# Patient Record
Sex: MALE | Race: WHITE | NOT HISPANIC OR LATINO | Employment: OTHER | URBAN - METROPOLITAN AREA
[De-identification: names, ages, dates, MRNs, and addresses within clinical notes are randomized per-mention and may not be internally consistent; named-entity substitution may affect disease eponyms.]

---

## 2024-03-12 ENCOUNTER — OFFICE VISIT (OUTPATIENT)
Dept: FAMILY MEDICINE CLINIC | Facility: CLINIC | Age: 69
End: 2024-03-12
Payer: MEDICARE

## 2024-03-12 VITALS
DIASTOLIC BLOOD PRESSURE: 84 MMHG | HEART RATE: 82 BPM | SYSTOLIC BLOOD PRESSURE: 132 MMHG | BODY MASS INDEX: 31.67 KG/M2 | WEIGHT: 246.8 LBS | TEMPERATURE: 98.3 F | OXYGEN SATURATION: 99 % | HEIGHT: 74 IN

## 2024-03-12 DIAGNOSIS — R73.03 PREDIABETES: ICD-10-CM

## 2024-03-12 DIAGNOSIS — I25.118 CORONARY ARTERY DISEASE OF NATIVE ARTERY OF NATIVE HEART WITH STABLE ANGINA PECTORIS (HCC): ICD-10-CM

## 2024-03-12 DIAGNOSIS — Z51.81 ANTICOAGULATION GOAL OF INR 2 TO 3: ICD-10-CM

## 2024-03-12 DIAGNOSIS — I10 PRIMARY HYPERTENSION: ICD-10-CM

## 2024-03-12 DIAGNOSIS — R60.0 BILATERAL LEG EDEMA: ICD-10-CM

## 2024-03-12 DIAGNOSIS — N18.32 CKD STAGE G3B/A1, GFR 30-44 AND ALBUMIN CREATININE RATIO <30 MG/G (HCC): Primary | ICD-10-CM

## 2024-03-12 DIAGNOSIS — K21.9 GASTROESOPHAGEAL REFLUX DISEASE WITHOUT ESOPHAGITIS: ICD-10-CM

## 2024-03-12 DIAGNOSIS — Z79.01 ANTICOAGULATION GOAL OF INR 2 TO 3: ICD-10-CM

## 2024-03-12 PROBLEM — E78.2 MIXED HYPERLIPIDEMIA: Status: ACTIVE | Noted: 2024-03-12

## 2024-03-12 PROBLEM — I48.0 PAROXYSMAL ATRIAL FIBRILLATION (HCC): Status: ACTIVE | Noted: 2024-03-12

## 2024-03-12 PROBLEM — I25.10 CORONARY ARTERY DISEASE: Status: ACTIVE | Noted: 2024-03-12

## 2024-03-12 PROBLEM — Z95.1 HX OF CABG: Status: ACTIVE | Noted: 2022-02-23

## 2024-03-12 PROBLEM — N40.0 BPH (BENIGN PROSTATIC HYPERPLASIA): Status: ACTIVE | Noted: 2023-04-20

## 2024-03-12 PROCEDURE — 99204 OFFICE O/P NEW MOD 45 MIN: CPT | Performed by: FAMILY MEDICINE

## 2024-03-12 RX ORDER — COLCHICINE 0.6 MG/1
0.6 TABLET ORAL 2 TIMES DAILY
COMMUNITY
Start: 2023-11-28 | End: 2024-11-27

## 2024-03-12 RX ORDER — LOSARTAN POTASSIUM 25 MG/1
25 TABLET ORAL DAILY
COMMUNITY

## 2024-03-12 RX ORDER — ISOSORBIDE MONONITRATE 30 MG/1
90 TABLET, EXTENDED RELEASE ORAL DAILY
COMMUNITY
Start: 2023-11-10

## 2024-03-12 RX ORDER — NITROGLYCERIN 0.4 MG/1
1 TABLET SUBLINGUAL
COMMUNITY
Start: 2023-09-29 | End: 2024-09-28

## 2024-03-12 RX ORDER — WARFARIN SODIUM 5 MG/1
TABLET ORAL
COMMUNITY

## 2024-03-12 RX ORDER — BUSPIRONE HYDROCHLORIDE 5 MG/1
5 TABLET ORAL 2 TIMES DAILY
COMMUNITY

## 2024-03-12 RX ORDER — FINASTERIDE 5 MG/1
5 TABLET, FILM COATED ORAL DAILY
COMMUNITY

## 2024-03-12 RX ORDER — WARFARIN SODIUM 7.5 MG/1
7.5 TABLET ORAL DAILY
COMMUNITY

## 2024-03-12 RX ORDER — ATORVASTATIN CALCIUM 80 MG/1
80 TABLET, FILM COATED ORAL EVERY EVENING
COMMUNITY

## 2024-03-12 RX ORDER — TORSEMIDE 20 MG/1
20 TABLET ORAL DAILY
COMMUNITY

## 2024-03-12 RX ORDER — METOPROLOL SUCCINATE 50 MG/1
150 TABLET, EXTENDED RELEASE ORAL DAILY
COMMUNITY
Start: 2023-08-22 | End: 2024-08-21

## 2024-03-12 NOTE — PROGRESS NOTES
Name: John Paul Borjas      : 1955      MRN: [id number]  Encounter Provider: Hadley Vargas MD  Encounter Date: 3/12/2024   Encounter department: [facility]    Assessment & Plan     1. CKD stage G3b/A1, GFR 30-44 and albumin creatinine ratio <30 mg/g (HCC)  -     Ambulatory Referral to Nephrology; Future    2. Coronary artery disease of native artery of native heart with stable angina pectoris (HCC)  -     Lipid panel; Future    3. Primary hypertension    4. Prediabetes  -     Hemoglobin A1C; Future    5. Gastroesophageal reflux disease without esophagitis    6. Bilateral leg edema    7. Anticoagulation goal of INR 2 to 3  -     Protime-INR; Standing      LE edema likely secondary to history of graft harvest and CKD  Recommend patient check his weight daily and take extra dose of torsemide if his weight increase by 3lb in 1 day or 5lb in 5 day.  Will obtain lipid panel and hba1c for eval  Recheck INR once monthly  Continue current medications    Establish care with nephrology. Discuss continuing losartan.          Subjective     HPI    68-year-old male patient presents for establishment of care.  Patient does have a history of cardiovascular disease status post stent and bypass surgery.  Patient has been seeing his cardiologist regularly however would like to establish with a primary care doctor.  In addition to cardiac issues, patient does have baseline chronic kidney disease, based on most recent blood work, patient is renal function is consistent with stage III-IV chronic kidney disease.  He has not seen by nephrology at this time.  Has been taking his medications including losartan, Lipitor, Imdur, metoprolol, Brilinta, torsemide.  Patient is anticoagulated with warfarin due to history of atrial fibrillation.   Last INR completed on 24: 2.6.       Review of Systems   Constitutional:  Positive for fatigue. Negative for chills and fever.   HENT:  Negative for congestion, sinus pain and  sore throat.    Respiratory:  Positive for shortness of breath. Negative for chest tightness.    Cardiovascular:  Positive for leg swelling. Negative for chest pain and palpitations.   Gastrointestinal:  Negative for abdominal pain.   Musculoskeletal:  Positive for back pain.        Butt and leg pain   Neurological:  Negative for dizziness, light-headedness and headaches.   Psychiatric/Behavioral:  Positive for sleep disturbance.        Past Medical History:   Diagnosis Date    Coronary artery disease 3/12/2024    GERD (gastroesophageal reflux disease) 3/12/2024    Gout     H/O three vessel coronary artery bypass     Hypertension 3/12/2024    Myocardial infarction (HCC)      Past Surgical History:   Procedure Laterality Date    CORONARY ANGIOPLASTY WITH STENT PLACEMENT      CORONARY ARTERY BYPASS GRAFT       No family history on file.  Social History     Socioeconomic History    Marital status: /Civil Union     Spouse name: Not on file    Number of children: Not on file    Years of education: Not on file    Highest education level: Not on file   Occupational History    Not on file   Tobacco Use    Smoking status: Not on file    Smokeless tobacco: Not on file   Substance and Sexual Activity    Alcohol use: Not on file    Drug use: Not on file    Sexual activity: Not on file   Other Topics Concern    Not on file   Social History Narrative    Not on file     Social Determinants of Health     Financial Resource Strain: Low Risk  (10/2/2023)    Received from [facility]    Overall Financial Resource Strain (CARDIA)     Difficulty of Paying Living Expenses: Not hard at all   Food Insecurity: No Food Insecurity (10/2/2023)    Received from [facility]    Hunger Vital Sign     Worried About Running Out of Food in the Last Year: Never true     Ran Out of Food in the Last Year: Never true   Transportation Needs: No Transportation Needs (10/2/2023)    Received from [facility]    PRAPARE - Transportation     Lack of Transportation (Medical): No     Lack of Transportation (Non-Medical): No   Physical Activity: Not on file   Stress: Not on file   Social Connections: Not on file   Intimate Partner Violence: Not At Risk (11/28/2023)    Received from [facility]    Humiliation, Afraid, Rape, and Kick questionnaire     Fear of Current or Ex-Partner: No     Emotionally Abused: No     Physically Abused: No     Sexually Abused: No   Housing Stability: Low Risk  (10/2/2023)    Received from [facility]    Housing Stability Vital Sign     Unable to Pay for Housing in the Last Year: No     Number of Places Lived in the Last Year: 1     Unstable Housing in the Last Year: No     Current Outpatient Medications on File Prior to Visit   Medication Sig    atorvastatin (LIPITOR) 80 mg tablet Take 80 mg by mouth every evening    busPIRone (BUSPAR) 5 mg tablet Take 5 mg by mouth 2 (two) times a day    colchicine (COLCRYS) 0.6 mg tablet Take 0.6 mg by mouth 2 (two) times a day    finasteride (PROSCAR) 5 mg tablet Take 5 mg by mouth daily    isosorbide mononitrate (IMDUR) 30 mg 24 hr tablet Take 90 mg by mouth daily    losartan (COZAAR) 25 mg tablet Take 25 mg by mouth daily    metoprolol succinate (TOPROL-XL) 50 mg 24 hr tablet Take 150 mg by mouth daily    nitroglycerin (NITROSTAT) 0.4 mg SL tablet Place 1 tablet under the tongue every 5 (five) minutes as needed    ticagrelor (BRILINTA) 90 MG Take 90 mg by mouth 2 (two) times a day    torsemide (DEMADEX) 20 mg tablet Take 20 mg by mouth daily    warfarin (COUMADIN) 5 mg tablet take 1 AND 1/2 to 2 tablets by mouth daily as directed    warfarin (COUMADIN) 7.5 mg tablet Take 7.5 mg by mouth daily     No Known Allergies  Immunization History   Administered Date(s) Administered    COVID-19 MODERNA VACC 0.5 ML IM 03/25/2021, 04/22/2021, 01/05/2022       Objective     /84 (BP Location: Left arm, Patient Position:  "Sitting, Cuff Size: Large)   Pulse 82   Temp 98.3 °F (36.8 °C) (Temporal)   Ht 6' 2\" (1.88 m)   Wt 112 kg (246 lb 12.8 oz)   SpO2 99%   BMI 31.69 kg/m²     Physical Exam  Vitals reviewed.   Constitutional:       General: He is not in acute distress.     Appearance: Normal appearance. He is well-developed. He is obese. He is not ill-appearing, toxic-appearing or diaphoretic.   HENT:      Head: Normocephalic and atraumatic.      Nose: Nose normal.   Eyes:      Pupils: Pupils are equal, round, and reactive to light.   Cardiovascular:      Rate and Rhythm: Normal rate and regular rhythm.      Pulses: Normal pulses.      Heart sounds: Normal heart sounds. No murmur heard.     No friction rub. No gallop.   Pulmonary:      Effort: Pulmonary effort is normal. No respiratory distress.      Breath sounds: Normal breath sounds.   Abdominal:      General: Bowel sounds are normal. There is no distension.      Palpations: Abdomen is soft.      Tenderness: There is no abdominal tenderness. There is no guarding.   Musculoskeletal:         General: No swelling, tenderness, deformity or signs of injury. Normal range of motion.   Skin:     General: Skin is warm and dry.      Capillary Refill: Capillary refill takes less than 2 seconds.      Coloration: Skin is not jaundiced.      Findings: No erythema or rash.   Neurological:      General: No focal deficit present.      Mental Status: He is alert and oriented to person, place, and time. Mental status is at baseline.      Cranial Nerves: No cranial nerve deficit.   Psychiatric:         Mood and Affect: Mood normal.         Hadley Vargas MD    "

## 2024-03-13 ENCOUNTER — TELEPHONE (OUTPATIENT)
Dept: NEPHROLOGY | Facility: CLINIC | Age: 69
End: 2024-03-13

## 2024-03-13 NOTE — TELEPHONE ENCOUNTER
I called and spoke to the patient and schedule him for his Nephrology Consult appointment ref by Dr. Hadley Vargas for  CKD stage G3b/A1, GFR 30-44 and albumin creatinine ratio <30 mg/g. Patient understood and was okay with the day and time of his appointment and patient understood that his appointment was with Lorena (NP).

## 2024-04-04 ENCOUNTER — TELEPHONE (OUTPATIENT)
Dept: OTHER | Facility: OTHER | Age: 69
End: 2024-04-04

## 2024-04-04 LAB
CHOLEST SERPL-MCNC: 144 MG/DL
CHOLEST/HDLC SERPL: 3.6 {RATIO}
EST. AVERAGE GLUCOSE BLD GHB EST-MCNC: 128 MG/DL
HBA1C MFR BLD: 6.1 %
HDLC SERPL-MCNC: 40 MG/DL (ref 23–92)
INR PPP: 3.3
LDLC SERPL CALC-MCNC: 72 MG/DL
NONHDLC SERPL-MCNC: 104 MG/DL
PROTHROMBIN TIME: 32.5 SEC (ref 12–14.6)
TRIGL SERPL-MCNC: 160 MG/DL

## 2024-04-04 NOTE — TELEPHONE ENCOUNTER
Patient called to confirm the type of blood work ordered by PCP. Triage RN made patient aware of that A1C, Lipid panel and INR was ordered on 3/12.

## 2024-04-23 ENCOUNTER — OFFICE VISIT (OUTPATIENT)
Dept: FAMILY MEDICINE CLINIC | Facility: CLINIC | Age: 69
End: 2024-04-23
Payer: MEDICARE

## 2024-04-23 VITALS
TEMPERATURE: 97.5 F | DIASTOLIC BLOOD PRESSURE: 80 MMHG | BODY MASS INDEX: 31.1 KG/M2 | SYSTOLIC BLOOD PRESSURE: 132 MMHG | OXYGEN SATURATION: 99 % | WEIGHT: 242.2 LBS | HEART RATE: 39 BPM

## 2024-04-23 DIAGNOSIS — Z11.59 ENCOUNTER FOR HEPATITIS C SCREENING TEST FOR LOW RISK PATIENT: ICD-10-CM

## 2024-04-23 DIAGNOSIS — Z12.11 ENCOUNTER FOR COLORECTAL CANCER SCREENING: ICD-10-CM

## 2024-04-23 DIAGNOSIS — Z12.5 PROSTATE CANCER SCREENING: ICD-10-CM

## 2024-04-23 DIAGNOSIS — Z12.12 ENCOUNTER FOR COLORECTAL CANCER SCREENING: ICD-10-CM

## 2024-04-23 DIAGNOSIS — R35.1 NOCTURIA: ICD-10-CM

## 2024-04-23 DIAGNOSIS — L03.032 CELLULITIS OF TOE OF LEFT FOOT: ICD-10-CM

## 2024-04-23 DIAGNOSIS — Z00.00 MEDICARE ANNUAL WELLNESS VISIT, SUBSEQUENT: Primary | ICD-10-CM

## 2024-04-23 PROCEDURE — G0438 PPPS, INITIAL VISIT: HCPCS | Performed by: FAMILY MEDICINE

## 2024-04-23 RX ORDER — TAMSULOSIN HYDROCHLORIDE 0.4 MG/1
0.4 CAPSULE ORAL
Qty: 30 CAPSULE | Refills: 0 | Status: SHIPPED | OUTPATIENT
Start: 2024-04-23

## 2024-04-23 RX ORDER — CEPHALEXIN 500 MG/1
500 CAPSULE ORAL EVERY 6 HOURS SCHEDULED
Qty: 28 CAPSULE | Refills: 0 | Status: SHIPPED | OUTPATIENT
Start: 2024-04-23 | End: 2024-04-30

## 2024-04-23 NOTE — PROGRESS NOTES
Assessment and Plan:     Problem List Items Addressed This Visit    None  Visit Diagnoses       Encounter for colorectal cancer screening    -  Primary    Relevant Orders    Ambulatory Referral to Gastroenterology    Cellulitis of toe of left foot        Relevant Medications    cephalexin (KEFLEX) 500 mg capsule    Nocturia        Relevant Medications    tamsulosin (FLOMAX) 0.4 mg    Prostate cancer screening        Relevant Orders    PSA, Total Screen    Encounter for hepatitis C screening test for low risk patient        Relevant Orders    Hepatitis C antibody             Preventive health issues were discussed with patient, and age appropriate screening tests were ordered as noted in patient's After Visit Summary.  Personalized health advice and appropriate referrals for health education or preventive services given if needed, as noted in patient's After Visit Summary.    Depression Screening Follow-up Plan: Patient's depression screening was positive with a PHQ-2 score of 4. Their PHQ-9 score was 10. Patient advised to follow-up with PCP for further management.  Patient's depressive symptom is secondary to recent grief.  A diagnosis of major depressive disorder is inappropriate at this time       History of Present Illness:     Patient presents for a Medicare Wellness Visit    HPI     Pt here for AMW visit. Ex-wife, high school sweet heart recently passed away on 4/12/24.   Patient is still processing the loss.   Patient is requesting a paperwork for temporary disability placard so he can park closer to different locations.  Continues to feel out of breath with minimal exertion.  Currently undergoing cardiac rehab which seems to be helping with some symptoms.    Patient has noticed some mild swelling and crusting over the second and third toe on his left foot.    There is a blood blister on the big toe of the left foot    PHQ-2/9 Depression Screening    Little interest or pleasure in doing things: 2 - more than  half the days  Feeling down, depressed, or hopeless: 2 - more than half the days  Trouble falling or staying asleep, or sleeping too much: 3 - nearly every day  Feeling tired or having little energy: 1 - several days  Poor appetite or overeatin - not at all  Feeling bad about yourself - or that you are a failure or have let yourself or your family down: 0 - not at all  Trouble concentrating on things, such as reading the newspaper or watching television: 2 - more than half the days  Moving or speaking so slowly that other people could have noticed. Or the opposite - being so fidgety or restless that you have been moving around a lot more than usual: 0 - not at all  Thoughts that you would be better off dead, or of hurting yourself in some way: 0 - not at all  PHQ-2 Score: 4  PHQ-2 Interpretation: POSITIVE depression screen  PHQ-9 Score: 10  PHQ-9 Interpretation: Moderate depression           Patient Care Team:  Hdaley Vargas MD as PCP - General (Family Medicine)    Review of Systems   Constitutional:  Positive for fatigue. Negative for chills and fever.   Respiratory:  Positive for shortness of breath. Negative for chest tightness.    Cardiovascular:  Negative for chest pain.   Genitourinary:  Positive for frequency.        Frequent nocturia   Skin:  Positive for rash (rash and swelling involving 2nd and 3rd toe of the left foot). Negative for wound.   Neurological:  Negative for dizziness, light-headedness and headaches.   Psychiatric/Behavioral:  Positive for dysphoric mood (wife passed away recently).           Problem List:     Patient Active Problem List   Diagnosis    Hypertension    Coronary artery disease    GERD (gastroesophageal reflux disease)    Bilateral leg edema    Hx of CABG    BPH (benign prostatic hyperplasia)    Mixed hyperlipidemia    Paroxysmal atrial fibrillation (HCC)      Past Medical and Surgical History:     Past Medical History:   Diagnosis Date    Coronary artery disease  3/12/2024    GERD (gastroesophageal reflux disease) 3/12/2024    Gout     H/O three vessel coronary artery bypass     Hypertension 3/12/2024    Myocardial infarction (HCC)      Past Surgical History:   Procedure Laterality Date    CORONARY ANGIOPLASTY WITH STENT PLACEMENT      CORONARY ARTERY BYPASS GRAFT        Family History:     Family History   Problem Relation Age of Onset    Stroke Mother     Heart disease Brother       Social History:     Social History     Socioeconomic History    Marital status:      Spouse name: None    Number of children: None    Years of education: None    Highest education level: None   Occupational History    None   Tobacco Use    Smoking status: Some Days     Types: Cigars     Passive exposure: Current    Smokeless tobacco: None   Vaping Use    Vaping status: Never Used   Substance and Sexual Activity    Alcohol use: Not Currently    Drug use: Yes     Types: Marijuana    Sexual activity: None   Other Topics Concern    None   Social History Narrative    None     Social Determinants of Health     Financial Resource Strain: Low Risk  (10/2/2023)    Received from [facility]    Overall Financial Resource Strain (CARDIA)     Difficulty of Paying Living Expenses: Not hard at all   Food Insecurity: No Food Insecurity (4/23/2024)    Hunger Vital Sign     Worried About Running Out of Food in the Last Year: Never true     Ran Out of Food in the Last Year: Never true   Transportation Needs: No Transportation Needs (4/23/2024)    PRAPARE - Transportation     Lack of Transportation (Medical): No     Lack of Transportation (Non-Medical): No   Physical Activity: Not on file   Stress: Not on file   Social Connections: Not on file   Intimate Partner Violence: Not At Risk (11/28/2023)    Received from [facility]    Humiliation, Afraid, Rape, and Kick questionnaire     Fear of Current or Ex-Partner: No     Emotionally Abused: No     Physically Abused: No      Sexually Abused: No   Housing Stability: Low Risk  (4/23/2024)    Housing Stability Vital Sign     Unable to Pay for Housing in the Last Year: No     Number of Places Lived in the Last Year: 1     Unstable Housing in the Last Year: No      Medications and Allergies:     Current Outpatient Medications   Medication Sig Dispense Refill    atorvastatin (LIPITOR) 80 mg tablet Take 80 mg by mouth every evening      busPIRone (BUSPAR) 5 mg tablet Take 5 mg by mouth 2 (two) times a day      cephalexin (KEFLEX) 500 mg capsule Take 1 capsule (500 mg total) by mouth every 6 (six) hours for 7 days 28 capsule 0    colchicine (COLCRYS) 0.6 mg tablet Take 0.6 mg by mouth 2 (two) times a day      finasteride (PROSCAR) 5 mg tablet Take 5 mg by mouth daily      isosorbide mononitrate (IMDUR) 30 mg 24 hr tablet Take 90 mg by mouth daily      losartan (COZAAR) 25 mg tablet Take 25 mg by mouth daily      metoprolol succinate (TOPROL-XL) 50 mg 24 hr tablet Take 150 mg by mouth daily      nitroglycerin (NITROSTAT) 0.4 mg SL tablet Place 1 tablet under the tongue every 5 (five) minutes as needed      tamsulosin (FLOMAX) 0.4 mg Take 1 capsule (0.4 mg total) by mouth daily with dinner 30 capsule 0    ticagrelor (BRILINTA) 90 MG Take 90 mg by mouth 2 (two) times a day      torsemide (DEMADEX) 20 mg tablet Take 20 mg by mouth daily      warfarin (COUMADIN) 5 mg tablet take 1 AND 1/2 to 2 tablets by mouth daily as directed      warfarin (COUMADIN) 7.5 mg tablet Take 7.5 mg by mouth daily       No current facility-administered medications for this visit.     No Known Allergies   Immunizations:     Immunization History   Administered Date(s) Administered    COVID-19 MODERNA VACC 0.5 ML IM 03/25/2021, 04/22/2021, 01/05/2022      Health Maintenance:         Topic Date Due    Hepatitis C Screening  Never done    Colorectal Cancer Screening  Never done         Topic Date Due    Pneumococcal Vaccine: 65+ Years (1 of 2 - PCV) Never done    Influenza  Vaccine (1) Never done    COVID-19 Vaccine (4 - 2023-24 season) 09/01/2023      Medicare Screening Tests and Risk Assessments:     John Paul is here for his Initial Wellness visit.     Health Risk Assessment:   Patient rates overall health as fair. Patient feels that their physical health rating is slightly worse. Patient is satisfied with their life. Eyesight was rated as same. Hearing was rated as same. Patient feels that their emotional and mental health rating is slightly worse. Patients states they are sometimes angry. Patient states they are often unusually tired/fatigued. Pain experienced in the last 7 days has been some. Patient's pain rating has been 3/10. Patient states that he has experienced no weight loss or gain in last 6 months. Leg, buttocks pain    Depression Screening:   PHQ-2 Score: 4  PHQ-9 Score: 10      Fall Risk Screening:   In the past year, patient has experienced: no history of falling in past year      Home Safety:  Patient does not have trouble with stairs inside or outside of their home. Patient has working smoke alarms and has working carbon monoxide detector. Home safety hazards include: none.     Nutrition:   mediterranean    Medications:   Patient is currently taking over-the-counter supplements. OTC medications include: see medication list. Patient is able to manage medications.     Activities of Daily Living (ADLs)/Instrumental Activities of Daily Living (IADLs):   Walk and transfer into and out of bed and chair?: Yes  Dress and groom yourself?: Yes    Bathe or shower yourself?: Yes    Feed yourself? Yes  Do your laundry/housekeeping?: Yes  Manage your money, pay your bills and track your expenses?: Yes  Make your own meals?: Yes    Do your own shopping?: Yes    Previous Hospitalizations:   Any hospitalizations or ED visits within the last 12 months?: Yes    How many hospitalizations have you had in the last year?: 1-2    Advance Care Planning:   Living will: No    Advanced directive  counseling given: Yes      Comments: 5 wishes given    Cognitive Screening:   Provider or family/friend/caregiver concerned regarding cognition?: Yes    PREVENTIVE SCREENINGS      Cardiovascular Screening:    General: History Lipid Disorder and Screening Current      Diabetes Screening:     General: Screening Current      Colorectal Cancer Screening:     General: Risks and Benefits Discussed    Due for: Colonoscopy - Low Risk      Prostate Cancer Screening:    General: Risks and Benefits Discussed    Due for: PSA      Osteoporosis Screening:    General: Screening Not Indicated      Abdominal Aortic Aneurysm (AAA) Screening:    Risk factors include: age between 65-76 yo and tobacco use        General: Screening Not Indicated      Lung Cancer Screening:     General: Screening Not Indicated      Hepatitis C Screening:    General: Risks and Benefits Discussed    Hep C Screening Accepted: Yes      Screening, Brief Intervention, and Referral to Treatment (SBIRT)    Screening      Single Item Drug Screening:  How often have you used an illegal drug (including marijuana) or a prescription medication for non-medical reasons in the past year? weekly  What drug(s) or prescription medication(s)? marijuana    Single Item Drug Screen Score: 3  Interpretation: POSITIVE screen for possible drug use disorder    Drug Abuse Screening Test (DAST-10):  1) Have you used drugs other than those required for medical reasons? Yes    Other Counseling Topics:   Car/seat belt/driving safety, skin self-exam, sunscreen and calcium and vitamin D intake and regular weightbearing exercise.     No results found.     Physical Exam:     /80 (BP Location: Left arm, Patient Position: Sitting, Cuff Size: Large)   Pulse (!) 39   Temp 97.5 °F (36.4 °C) (Temporal)   Wt 110 kg (242 lb 3.2 oz)   SpO2 99%   BMI 31.10 kg/m²     Physical Exam  Vitals reviewed.   Constitutional:       General: He is not in acute distress.     Appearance: Normal  appearance. He is obese. He is not ill-appearing, toxic-appearing or diaphoretic.   Cardiovascular:      Rate and Rhythm: Normal rate and regular rhythm.      Pulses: Normal pulses.      Heart sounds: Normal heart sounds. No murmur heard.  Pulmonary:      Effort: Pulmonary effort is normal. No respiratory distress.      Breath sounds: Normal breath sounds.   Abdominal:      General: Abdomen is flat. Bowel sounds are normal. There is no distension.      Palpations: Abdomen is soft.   Musculoskeletal:         General: No swelling or deformity.      Right lower leg: Edema present.      Left lower leg: Edema present.      Comments: Mild pitting edema   Skin:     Capillary Refill: Capillary refill takes less than 2 seconds.      Coloration: Skin is not jaundiced.      Findings: No bruising.      Comments: Mild swelling involving the second and third toe of the left foot, some dry skin/crusting over the affected area  No significant erythema  There is a subcentimeter blister on the lateral edge of the great toe on the left foot   Neurological:      General: No focal deficit present.      Mental Status: He is alert.   Psychiatric:         Mood and Affect: Mood normal.           Hadley Vargas MD

## 2024-04-23 NOTE — PATIENT INSTRUCTIONS
Medicare Preventive Visit Patient Instructions  Thank you for completing your Welcome to Medicare Visit or Medicare Annual Wellness Visit today. Your next wellness visit will be due in one year (4/24/2025).  The screening/preventive services that you may require over the next 5-10 years are detailed below. Some tests may not apply to you based off risk factors and/or age. Screening tests ordered at today's visit but not completed yet may show as past due. Also, please note that scanned in results may not display below.  Preventive Screenings:  Service Recommendations Previous Testing/Comments   Colorectal Cancer Screening  Colonoscopy    Fecal Occult Blood Test (FOBT)/Fecal Immunochemical Test (FIT)  Fecal DNA/Cologuard Test  Flexible Sigmoidoscopy Age: 45-75 years old   Colonoscopy: every 10 years (May be performed more frequently if at higher risk)  OR  FOBT/FIT: every 1 year  OR  Cologuard: every 3 years  OR  Sigmoidoscopy: every 5 years  Screening may be recommended earlier than age 45 if at higher risk for colorectal cancer. Also, an individualized decision between you and your healthcare provider will decide whether screening between the ages of 76-85 would be appropriate. Colonoscopy: Not on file  FOBT/FIT: Not on file  Cologuard: Not on file  Sigmoidoscopy: Not on file          Prostate Cancer Screening Individualized decision between patient and health care provider in men between ages of 55-69   Medicare will cover every 12 months beginning on the day after your 50th birthday PSA: No results in last 5 years           Hepatitis C Screening Once for adults born between 1945 and 1965  More frequently in patients at high risk for Hepatitis C Hep C Antibody: Not on file        Diabetes Screening 1-2 times per year if you're at risk for diabetes or have pre-diabetes Fasting glucose: No results in last 5 years (No results in last 5 years)  A1C: 6.1 % (4/4/2024)  Screening Current   Cholesterol Screening Once  every 5 years if you don't have a lipid disorder. May order more often based on risk factors. Lipid panel: 04/04/2024  Screening Not Indicated  History Lipid Disorder      Other Preventive Screenings Covered by Medicare:  Abdominal Aortic Aneurysm (AAA) Screening: covered once if your at risk. You're considered to be at risk if you have a family history of AAA or a male between the age of 65-75 who smoking at least 100 cigarettes in your lifetime.  Lung Cancer Screening: covers low dose CT scan once per year if you meet all of the following conditions: (1) Age 55-77; (2) No signs or symptoms of lung cancer; (3) Current smoker or have quit smoking within the last 15 years; (4) You have a tobacco smoking history of at least 20 pack years (packs per day x number of years you smoked); (5) You get a written order from a healthcare provider.  Glaucoma Screening: covered annually if you're considered high risk: (1) You have diabetes OR (2) Family history of glaucoma OR (3)  aged 50 and older OR (4)  American aged 65 and older  Osteoporosis Screening: covered every 2 years if you meet one of the following conditions: (1) Have a vertebral abnormality; (2) On glucocorticoid therapy for more than 3 months; (3) Have primary hyperparathyroidism; (4) On osteoporosis medications and need to assess response to drug therapy.  HIV Screening: covered annually if you're between the age of 15-65. Also covered annually if you are younger than 15 and older than 65 with risk factors for HIV infection. For pregnant patients, it is covered up to 3 times per pregnancy.    Immunizations:  Immunization Recommendations   Influenza Vaccine Annual influenza vaccination during flu season is recommended for all persons aged >= 6 months who do not have contraindications   Pneumococcal Vaccine   * Pneumococcal conjugate vaccine = PCV13 (Prevnar 13), PCV15 (Vaxneuvance), PCV20 (Prevnar 20)  * Pneumococcal polysaccharide vaccine  = PPSV23 (Pneumovax) Adults 19-65 yo with certain risk factors or if 65+ yo  If never received any pneumonia vaccine: recommend Prevnar 20 (PCV20)  Give PCV20 if previously received 1 dose of PCV13 or PPSV23   Hepatitis B Vaccine 3 dose series if at intermediate or high risk (ex: diabetes, end stage renal disease, liver disease)   Respiratory syncytial virus (RSV) Vaccine - COVERED BY MEDICARE PART D  * RSVPreF3 (Arexvy) CDC recommends that adults 60 years of age and older may receive a single dose of RSV vaccine using shared clinical decision-making (SCDM)   Tetanus (Td) Vaccine - COST NOT COVERED BY MEDICARE PART B Following completion of primary series, a booster dose should be given every 10 years to maintain immunity against tetanus. Td may also be given as tetanus wound prophylaxis.   Tdap Vaccine - COST NOT COVERED BY MEDICARE PART B Recommended at least once for all adults. For pregnant patients, recommended with each pregnancy.   Shingles Vaccine (Shingrix) - COST NOT COVERED BY MEDICARE PART B  2 shot series recommended in those 19 years and older who have or will have weakened immune systems or those 50 years and older     Health Maintenance Due:      Topic Date Due   • Hepatitis C Screening  Never done   • Colorectal Cancer Screening  Never done     Immunizations Due:      Topic Date Due   • Pneumococcal Vaccine: 65+ Years (1 of 2 - PCV) Never done   • Influenza Vaccine (1) Never done   • COVID-19 Vaccine (4 - 2023-24 season) 09/01/2023     Advance Directives   What are advance directives?  Advance directives are legal documents that state your wishes and plans for medical care. These plans are made ahead of time in case you lose your ability to make decisions for yourself. Advance directives can apply to any medical decision, such as the treatments you want, and if you want to donate organs.   What are the types of advance directives?  There are many types of advance directives, and each state has rules  about how to use them. You may choose a combination of any of the following:  Living will:  This is a written record of the treatment you want. You can also choose which treatments you do not want, which to limit, and which to stop at a certain time. This includes surgery, medicine, IV fluid, and tube feedings.   Durable power of  for healthcare (DPAHC):  This is a written record that states who you want to make healthcare choices for you when you are unable to make them for yourself. This person, called a proxy, is usually a family member or a friend. You may choose more than 1 proxy.  Do not resuscitate (DNR) order:  A DNR order is used in case your heart stops beating or you stop breathing. It is a request not to have certain forms of treatment, such as CPR. A DNR order may be included in other types of advance directives.  Medical directive:  This covers the care that you want if you are in a coma, near death, or unable to make decisions for yourself. You can list the treatments you want for each condition. Treatment may include pain medicine, surgery, blood transfusions, dialysis, IV or tube feedings, and a ventilator (breathing machine).  Values history:  This document has questions about your views, beliefs, and how you feel and think about life. This information can help others choose the care that you would choose.  Why are advance directives important?  An advance directive helps you control your care. Although spoken wishes may be used, it is better to have your wishes written down. Spoken wishes can be misunderstood, or not followed. Treatments may be given even if you do not want them. An advance directive may make it easier for your family to make difficult choices about your care.   Depression   Depression  is a medical condition that causes feelings of sadness or hopelessness that do not go away. Depression may cause you to lose interest in things you used to enjoy. These feelings may  interfere with your daily life.  Call your local emergency number (911 in the [place]) if:   You think about harming yourself or someone else.  You have done something on purpose to hurt yourself.  The following resources are available at any time to help you, if needed:   National Suicide Prevention Lifeline: 1-970.971.3418 (1-917-733-TALK)   Suicide Hotline: 1-171.941.2647 (5-873-JLCSQWR)   For a list of international numbers: https://save.org/find-help/international-resources/  Treatment for depression may include medicine to relieve depression. Medicine is often used together with therapy. Therapy is a way for you to talk about your feelings and anything that may be causing depression. Therapy can be done alone or in a group. It may also be done with family members or a significant other.  Get regular physical activity.    Create a regular sleep schedule.    Eat a variety of healthy foods.    Do not drink alcohol or use drugs.     Cigarette Smoking and Your Health   Risks to your health if you smoke:  Nicotine and other chemicals found in tobacco damage every cell in your body. Even if you are a light smoker, you have an increased risk for cancer, heart disease, and lung disease. If you are pregnant or have diabetes, smoking increases your risk for complications.   Benefits to your health if you stop smoking:   You decrease respiratory symptoms such as coughing, wheezing, and shortness of breath.   You reduce your risk for cancers of the lung, mouth, throat, kidney, bladder, pancreas, stomach, and cervix. If you already have cancer, you increase the benefits of chemotherapy. You also reduce your risk for cancer returning or a second cancer from developing.   You reduce your risk for heart disease, blood clots, heart attack, and stroke.   You reduce your risk for lung infections, and diseases such as pneumonia, asthma, chronic bronchitis, and emphysema.  Your circulation improves. More oxygen can be delivered to your  body. If you have diabetes, you lower your risk for complications, such as kidney, artery, and eye diseases. You also lower your risk for nerve damage. Nerve damage can lead to amputations, poor vision, and blindness.  You improve your body's ability to heal and to fight infections.  For more information and support to stop smoking:   [employer]  Phone: 9- 083 - 987-9292  Web Address: www.[employer]  Weight Management   Why it is important to manage your weight:  Being overweight increases your risk of health conditions such as heart disease, high blood pressure, type 2 diabetes, and certain types of cancer. It can also increase your risk for osteoarthritis, sleep apnea, and other respiratory problems. Aim for a slow, steady weight loss. Even a small amount of weight loss can lower your risk of health problems.  How to lose weight safely:  A safe and healthy way to lose weight is to eat fewer calories and get regular exercise. You can lose up about 1 pound a week by decreasing the number of calories you eat by 500 calories each day.   Healthy meal plan for weight management:  A healthy meal plan includes a variety of foods, contains fewer calories, and helps you stay healthy. A healthy meal plan includes the following:  Eat whole-grain foods more often.  A healthy meal plan should contain fiber. Fiber is the part of grains, fruits, and vegetables that is not broken down by your body. Whole-grain foods are healthy and provide extra fiber in your diet. Some examples of whole-grain foods are whole-wheat breads and pastas, oatmeal, brown rice, and bulgur.  Eat a variety of vegetables every day.  Include dark, leafy greens such as spinach, kale, karly greens, and mustard greens. Eat yellow and orange vegetables such as carrots, sweet potatoes, and winter squash.   Eat a variety of fruits every day.  Choose fresh or canned fruit (canned in its own juice or light syrup) instead of juice. Fruit juice has very little  or no fiber.  Eat low-fat dairy foods.  Drink fat-free (skim) milk or 1% milk. Eat fat-free yogurt and low-fat cottage cheese. Try low-fat cheeses such as mozzarella and other reduced-fat cheeses.  Choose meat and other protein foods that are low in fat.  Choose beans or other legumes such as split peas or lentils. Choose fish, skinless poultry (chicken or turkey), or lean cuts of red meat (beef or pork). Before you cook meat or poultry, cut off any visible fat.   Use less fat and oil.  Try baking foods instead of frying them. Add less fat, such as margarine, sour cream, regular salad dressing and mayonnaise to foods. Eat fewer high-fat foods. Some examples of high-fat foods include french fries, doughnuts, ice cream, and cakes.  Eat fewer sweets.  Limit foods and drinks that are high in sugar. This includes candy, cookies, regular soda, and sweetened drinks.  Exercise:  Exercise at least 30 minutes per day on most days of the week. Some examples of exercise include walking, biking, dancing, and swimming. You can also fit in more physical activity by taking the stairs instead of the elevator or parking farther away from stores. Ask your healthcare provider about the best exercise plan for you.      © Copyright [employer] 2018 Information is for End User's use only and may not be sold, redistributed or otherwise used for commercial purposes. All illustrations and images included in CareNotes® are the copyrighted property of [employer] or [employer]

## 2024-04-29 ENCOUNTER — TELEPHONE (OUTPATIENT)
Age: 69
End: 2024-04-29

## 2024-04-29 NOTE — TELEPHONE ENCOUNTER
Patient calling to ask if he is supposed to discontinue the finasteride or if he is to take it in conjunction with with the new medication given . Patient did not remember the name.  Joseline Mendoza

## 2024-04-30 ENCOUNTER — TELEPHONE (OUTPATIENT)
Dept: NEPHROLOGY | Facility: CLINIC | Age: 69
End: 2024-04-30

## 2024-04-30 DIAGNOSIS — N18.32 STAGE 3B CHRONIC KIDNEY DISEASE (HCC): Primary | ICD-10-CM

## 2024-04-30 NOTE — TELEPHONE ENCOUNTER
Called spoke with patient advised patient to get non-fasting labs done 1 week prior to 05/09/24 consult appointment with CHIQUITA Carrion. patient verbalized understanding and is okay with it.  lab orders faxed to [facility] [place] fax # [phone]. per patient's request.

## 2024-05-01 LAB
25(OH)D3+25(OH)D2 SERPL-MCNC: 14 NG/ML (ref 30–100)
ALBUMIN/CREAT UR: 60.4
ANION GAP SERPL CALCULATED.3IONS-SCNC: 10 MMOL/L (ref 3–11)
BASOPHILS # BLD AUTO: 0 THOU/CMM (ref 0–0.1)
BASOPHILS NFR BLD AUTO: 0 %
BUN SERPL-MCNC: 46 MG/DL (ref 7–28)
CALCIUM SERPL-MCNC: 9 MG/DL (ref 8.5–10.1)
CHLORIDE SERPL-SCNC: 105 MMOL/L (ref 100–109)
CO2 SERPL-SCNC: 26 MMOL/L (ref 21–31)
CREAT SERPL-MCNC: 2.32 MG/DL (ref 0.53–1.3)
CREAT UR-MCNC: 44.7 MG/DL (ref 50–200)
CYTOLOGY CMNT CVX/VAG CYTO-IMP: ABNORMAL
DIFFERENTIAL METHOD BLD: ABNORMAL
EOSINOPHIL # BLD AUTO: 0.2 THOU/CMM (ref 0–0.5)
EOSINOPHIL NFR BLD AUTO: 3 %
ERYTHROCYTE [DISTWIDTH] IN BLOOD BY AUTOMATED COUNT: 19 % (ref 12–16)
GFR/BSA.PRED SERPLBLD CYS-BASED-ARV: 30 ML/MIN/{1.73_M2}
GLUCOSE SERPL-MCNC: 85 MG/DL (ref 65–99)
GLUCOSE UR QL STRIP: NEGATIVE MG/DL
HCT VFR BLD AUTO: 29.4 % (ref 37–48)
HGB BLD-MCNC: 9.9 G/DL (ref 12.5–17)
HGB UR QL STRIP: NEGATIVE MG/DL
HYALINE CASTS URNS QL MICRO: NORMAL /LPF (ref 0–2)
KETONES UR QL STRIP: NEGATIVE MG/DL
LEUKOCYTE ESTERASE UR QL STRIP: NEGATIVE /UL
LYMPHOCYTES # BLD AUTO: 1 THOU/CMM (ref 1–3)
LYMPHOCYTES NFR BLD AUTO: 14 %
MCH RBC QN AUTO: 29.1 PG (ref 27–36)
MCHC RBC AUTO-ENTMCNC: 33.5 G/DL (ref 32–37)
MCV RBC AUTO: 87 FL (ref 80–100)
MICROALBUMIN UR-MCNC: 2.7 MG/DL
MONOCYTES # BLD AUTO: 0.6 THOU/CMM (ref 0.3–1)
MONOCYTES NFR BLD AUTO: 8 %
MUCOUS THREADS URNS QL MICRO: NORMAL
NEUTROPHILS # BLD AUTO: 5.3 THOU/CMM (ref 1.8–7.8)
NEUTROPHILS NFR BLD AUTO: 75 %
NITRITE UR QL STRIP: NEGATIVE
PH UR: 6 [PH] (ref 4.5–8)
PHOSPHATE SERPL-MCNC: 3.1 MG/DL (ref 2.3–4.6)
PLATELET # BLD AUTO: 351 THOU/CMM (ref 140–350)
PMV BLD REES-ECKER: 8.3 FL (ref 7.5–11.3)
POTASSIUM SERPL-SCNC: 4.3 MMOL/L (ref 3.5–5.2)
PROT 24H UR-MRATE: NEGATIVE MG/DL
RBC # BLD AUTO: 3.39 MILL/CMM (ref 4–5.4)
RBC #/AREA URNS HPF: NORMAL /HPF (ref 0–2)
SL AMB POCT URINE COMMENT: NORMAL
SODIUM SERPL-SCNC: 141 MMOL/L (ref 135–145)
SP GR UR: 1.01 (ref 1–1.03)
SQUAMOUS #/AREA URNS HPF: NORMAL /LPF (ref 0–5)
URATE SERPL-MCNC: 11.3 MG/DL (ref 3.5–7)
WBC # BLD AUTO: 7.1 THOU/CMM (ref 4–10.5)
WBC #/AREA URNS HPF: NORMAL /HPF (ref 0–5)

## 2024-05-02 ENCOUNTER — TELEPHONE (OUTPATIENT)
Age: 69
End: 2024-05-02

## 2024-05-02 LAB — PTH-INTACT SERPL-MCNC: 114 PG/ML (ref 12–88)

## 2024-05-02 NOTE — PROGRESS NOTES
NEPHROLOGY OFFICE NOTE    Patient: John Paul Borjas               Sex: male           YOB: 1955        Age:  68 y.o.       5/9/2024      BACKGROUND     I had the pleasure of seeing John Paul Borjas in the nephrology office today for consultation.  He has a past medical history significant for hypertension, CAD s/p CABG, maze procedure, atrial fibrillation, GERD, BPH, and hyperlipidemia.  He was referred to our office for further evaluation of management of chronic kidney disease.    He has history of hypertension, currently maintained on losartan, isosorbide, and metoprolol.    He is an extensive cardiac history and is following with [facility] cardiology and was most recently seen by CHIQUITA Moeller. I personally reviewed the office note from 5/2/2024.  He has underlying severe ischemic cardiomyopathy and currently maintained on torsemide 40 mg once daily.  He is also maintained on metoprolol and isosorbide.    After review of the medical record, it appears that baseline creatinine levels have been fluctuating around 2.0 dating back through 2022.  Patient has never seen a nephrologist in the past.    He reports he previously used NSAIDs on a regular basis for pain management, but had stopped several years back.    Most recent labs were obtained on 5/1/2024, which we have reviewed together.    SUBJECTIVE     He currently has no complaints at this time and is feeling well. Patient denies any chest pain or shortness of breath.  Reports chronic lower extremity edema which appears at baseline today.    REVIEW OF SYSTEMS     Review of Systems   Constitutional:  Positive for activity change and fatigue. Negative for chills and fever.   HENT:  Negative for trouble swallowing.    Respiratory:  Negative for shortness of breath.    Cardiovascular:  Positive for leg swelling. Negative for chest pain.   Gastrointestinal:  Negative for constipation, diarrhea, nausea and vomiting.   Genitourinary:  Negative for difficulty  urinating, dysuria, frequency and hematuria.   Musculoskeletal:  Negative for back pain.   Skin:  Negative for pallor.   Neurological:  Negative for dizziness, syncope, weakness and light-headedness.   Psychiatric/Behavioral:  Negative for sleep disturbance. The patient is not nervous/anxious.        OBJECTIVE     Current Weight: Weight - Scale: 109 kg (240 lb)  Vitals:    05/09/24 1316   BP: 126/84   Pulse: 67   Resp: 16   Temp: (!) 96.9 °F (36.1 °C)   SpO2: 96%     Body mass index is 31.66 kg/m².    CURRENT MEDICATIONS       Current Outpatient Medications:     atorvastatin (LIPITOR) 80 mg tablet, Take 80 mg by mouth every evening, Disp: , Rfl:     busPIRone (BUSPAR) 5 mg tablet, Take 5 mg by mouth 2 (two) times a day, Disp: , Rfl:     colchicine (COLCRYS) 0.6 mg tablet, Take 0.6 mg by mouth 2 (two) times a day, Disp: , Rfl:     finasteride (PROSCAR) 5 mg tablet, Take 5 mg by mouth daily, Disp: , Rfl:     isosorbide mononitrate (IMDUR) 30 mg 24 hr tablet, Take 90 mg by mouth daily, Disp: , Rfl:     losartan (COZAAR) 25 mg tablet, Take 25 mg by mouth daily, Disp: , Rfl:     metoprolol succinate (TOPROL-XL) 50 mg 24 hr tablet, Take 150 mg by mouth daily, Disp: , Rfl:     nitroglycerin (NITROSTAT) 0.4 mg SL tablet, Place 1 tablet under the tongue every 5 (five) minutes as needed, Disp: , Rfl:     tamsulosin (FLOMAX) 0.4 mg, Take 1 capsule (0.4 mg total) by mouth daily with dinner, Disp: 30 capsule, Rfl: 0    ticagrelor (BRILINTA) 90 MG, Take 90 mg by mouth 2 (two) times a day, Disp: , Rfl:     torsemide (DEMADEX) 20 mg tablet, Take 40 mg by mouth daily, Disp: , Rfl:     warfarin (COUMADIN) 5 mg tablet, take 1 AND 1/2 to 2 tablets by mouth daily as directed, Disp: , Rfl:     warfarin (COUMADIN) 7.5 mg tablet, Take 7.5 mg by mouth daily, Disp: , Rfl:     PHYSICAL EXAMINATION     Physical Exam  Vitals reviewed.   Constitutional:       General: He is not in acute distress.  HENT:      Head: Normocephalic.       Mouth/Throat:      Lips: Pink.      Mouth: Mucous membranes are moist.   Eyes:      General: Lids are normal. No scleral icterus.  Cardiovascular:      Rate and Rhythm: Normal rate and regular rhythm.      Heart sounds: S1 normal and S2 normal.   Pulmonary:      Effort: Pulmonary effort is normal. No accessory muscle usage or respiratory distress.      Breath sounds: Normal breath sounds.   Abdominal:      General: There is no distension.      Tenderness: There is no abdominal tenderness.   Musculoskeletal:      Cervical back: Normal range of motion and neck supple. No tenderness.      Right lower leg: Edema present.      Left lower leg: Edema present.   Skin:     General: Skin is warm.      Coloration: Skin is not cyanotic or jaundiced.   Neurological:      General: No focal deficit present.      Mental Status: He is alert and oriented to person, place, and time.   Psychiatric:         Attention and Perception: Attention normal.         Speech: Speech normal.         Behavior: Behavior is cooperative.           LAB RESULTS     [facility] 5/1/2024:  Sodium 141  Potassium 4.3  Chloride 105  CO2 26  BUN 46  Creatinine 2.32  Calcium 9.0   eGFR 30      RADIOLOGY RESULTS      CT renal stone study 8/14/2022:  Documented no renal mass or obstruction.    Ultrasound kidney and bladder 8/12/2022:  FINDINGS:     RIGHT KIDNEY:     Size: 11.2 cm in length.   Pelvicalyceal dilatation: None.   Parenchyma: Within normal limits.   Calculi: None.   Masses: None.     LEFT KIDNEY:     Size: 11.2 cm in length.   Pelvicalyceal dilatation: None.   Parenchyma: Within normal limits.   Calculi: None.   Masses: None. The small cystic lesion seen at the midpole could not be   identified on this ultrasound study.     URINARY BLADDER:     Unremarkable to visualized extent.     ASSESSMENT/PLAN     Chronic kidney disease stage IIIb/A2:  After review of the medical record, it appears that baseline creatinine levels have been fluctuating around 2.0 dating  back through 2022.  Most recent creatinine level 2.32 with an EGFR of 30, patient noted worsening edema and weight gain around this time and torsemide dosing was increased to 40 mg twice daily for a period of 3 days and is now maintained on 40 mg once daily.    Most recent urinalysis showed no significant hematuria or proteinuria.  Most recent urine microalbumin to creatinine ratio slightly elevated at 60.4.    Prior CT imaging and renal US in August 2022 noted no renal mass or evidence of obstruction.  Patient is maintained on tamsulosin and finasteride and was without urinary complaints today.  Most recent echocardiogram on 7/20/2023 noted an ejection fraction of 25 to 30% and he is currently maintained on torsemide 40 mg daily.  Reports chronic lower extremity edema that is improved and stable on current dose of diuretic, encourage use of lower extremity compression stockings.    Etiology of CKD multifactorial in the setting of hypertensive nephrosclerosis, prior NSAID use, and cardiorenal syndrome type II.  I suspect fluctuating creatinine levels based on volume status and need for diuretic.  Diagnosis of chronic kidney disease was discussed in detail with the patient today.    Advised hydration with up to 2 L of water daily and continued avoidance of nephrotoxic agents such as NSAIDs.  Will obtain CKD labs prior to follow-up.    Hypertension:   Currently maintained on losartan 50 mg daily and metoprolol 150 mg daily.  Blood pressure acceptable range on the current regimen.  Advised he continue to monitor home blood pressures and follow a low-salt diet.    Cardiomyopathy:  Most recent echocardiogram on 7/20/2023 revealed an ejection fraction of 25 to 30% with global hypokinesis.  He is following with [facility] cardiology, currently maintained on torsemide 40 mg daily.  Apart from chronic lower extremity edema does clinically appear euvolemic on examination.  Advised to continue monitoring daily weights and follow a  "low-salt diet, continue close follow-up with cardiology.    Vitamin D deficiency, secondary hyperparathyroidism of renal origin:  Calcium 9.0, vitamin D 14, , phosphorus 3.1.  Will begin cholecalciferol 4000 units once daily and repeat labs prior to follow-up.    Proteinuria:  Most recent urine microalbumin to creatinine ratio slightly elevated at 60.4.  Currently maintained on losartan 50 mg daily and will continue.  Will repeat urinalysis and urine microalbumin to creatinine ratio prior to follow-up.    Hyperuricemia:  Uric acid level elevated 11.3, reports a history of gout in the past and periodically uses colchicine.  Avoiding use of NSAIDs.  Suspect etiology multifactorial in the setting of chronic kidney disease and diuretic use.  Advise low purine diet, printout provided at office visit.  If no significant improvement on follow-up consider initiation of allopurinol.    Anemia:  Most recent hemoglobin 9.9, will obtain updated iron panel prior to follow-up.    Thank you for the consultation to assist in the care of John Paul Borjas.  We will continue to follow the patient with you.  Recommend nephrology follow-up in 3 to 4 months and will repeat CKD labs prior to appointment.    CHIQUITA Kaur  Nephrology  5/9/2024      Portions of the record may have been created with voice recognition software. Occasional wrong word or \"sound a like\" substitutions may have occurred due to the inherent limitations of voice recognition software. Read the chart carefully and recognize, using context, where substitutions have occurred.   "

## 2024-05-02 NOTE — TELEPHONE ENCOUNTER
05/02/24  Screened by: Nazia Vegas    Referring Provider Dr. Vargas    Pre- Screening:     There is no height or weight on file to calculate BMI.31.10  Has patient been referred for a routine screening Colonoscopy? yes  Is the patient between 45-75 years old? yes      Previous Colonoscopy yes   If yes:    Date:     Facility:     Reason:           Does the patient want to see a Gastroenterologist prior to their procedure OR are they having any GI symptoms? no    Has the patient been hospitalized or had abdominal surgery in the past 6 months? yes    Does the patient use supplemental oxygen? no    Does the patient take Coumadin, Lovenox, Plavix, Elliquis, Xarelto, or other blood thinning medication? yes    Has the patient had a stroke, cardiac event, or stent placed in the past year? yes    SCHEDULING STAFF: If patient answers NO to above questions, then schedule procedure. If patient answers YES to above questions, then schedule office appointment.     If patient is between 45yrs - 49yrs, please advise patient that we will have to confirm benefits & coverage with their insurance company for a routine screening colonoscopy.

## 2024-05-07 ENCOUNTER — TELEPHONE (OUTPATIENT)
Dept: NEPHROLOGY | Facility: CLINIC | Age: 69
End: 2024-05-07

## 2024-05-07 NOTE — TELEPHONE ENCOUNTER
I called [employer] @ [phone] to verify eligibility for the patient ans as per  [employer] patient has been active since 12/01/2022 with a Medicare Supplement Plan N . Patients insurance is paid since 12/01/2022 thru 05/31/2024.

## 2024-05-08 ENCOUNTER — TELEPHONE (OUTPATIENT)
Dept: NEPHROLOGY | Facility: CLINIC | Age: 69
End: 2024-05-08

## 2024-05-09 ENCOUNTER — CONSULT (OUTPATIENT)
Dept: NEPHROLOGY | Facility: CLINIC | Age: 69
End: 2024-05-09
Payer: MEDICARE

## 2024-05-09 VITALS
DIASTOLIC BLOOD PRESSURE: 84 MMHG | OXYGEN SATURATION: 96 % | HEART RATE: 67 BPM | TEMPERATURE: 96.9 F | SYSTOLIC BLOOD PRESSURE: 126 MMHG | WEIGHT: 240 LBS | RESPIRATION RATE: 16 BRPM | BODY MASS INDEX: 31.81 KG/M2 | HEIGHT: 73 IN

## 2024-05-09 DIAGNOSIS — N18.32 STAGE 3B CHRONIC KIDNEY DISEASE (HCC): Primary | ICD-10-CM

## 2024-05-09 DIAGNOSIS — I50.20 HFREF (HEART FAILURE WITH REDUCED EJECTION FRACTION) (HCC): ICD-10-CM

## 2024-05-09 DIAGNOSIS — N25.81 SECONDARY HYPERPARATHYROIDISM OF RENAL ORIGIN (HCC): ICD-10-CM

## 2024-05-09 DIAGNOSIS — E79.0 HYPERURICEMIA: ICD-10-CM

## 2024-05-09 DIAGNOSIS — I42.9 CARDIOMYOPATHY, UNSPECIFIED TYPE (HCC): ICD-10-CM

## 2024-05-09 DIAGNOSIS — E55.9 VITAMIN D DEFICIENCY: ICD-10-CM

## 2024-05-09 DIAGNOSIS — N18.32 CKD STAGE G3B/A1, GFR 30-44 AND ALBUMIN CREATININE RATIO <30 MG/G (HCC): ICD-10-CM

## 2024-05-09 DIAGNOSIS — I10 PRIMARY HYPERTENSION: ICD-10-CM

## 2024-05-09 DIAGNOSIS — R80.9 PROTEINURIA, UNSPECIFIED TYPE: ICD-10-CM

## 2024-05-09 DIAGNOSIS — D64.9 ANEMIA, UNSPECIFIED TYPE: ICD-10-CM

## 2024-05-09 PROCEDURE — 99204 OFFICE O/P NEW MOD 45 MIN: CPT

## 2024-05-09 NOTE — PATIENT INSTRUCTIONS
Start cholecalciferol (vitamin D3) 4000 units once daily   Follow a lower purine diet, we will repeat your uric acid level on follow-up  Continue monitor your blood pressure daily, goal average 130/80 or less     Chronic Kidney Disease   AMBULATORY CARE:   Chronic kidney disease (CKD)  is the gradual and permanent loss of kidney function. Normally, the kidneys remove fluid, chemicals, and waste from your blood. These wastes are turned into urine by your kidneys. CKD may worsen over time and lead to kidney failure.       Common signs and symptoms include the following:   Changes in how often you need to urinate    Swelling in your arms, legs, or feet    Shortness of breath    Fatigue or weakness    Bad or bitter taste in your mouth    Nausea, vomiting, or loss of appetite    Call your local emergency number (911 in the [place]) if:   You have a seizure.    You have shortness of breath.    Seek care immediately if:   You are confused and very drowsy.       Call your doctor or nephrologist if:   You suddenly gain or lose more weight than your healthcare provider has told you is okay.    You have itchy skin or a rash.    You urinate more or less than you normally do.    You have blood in your urine.    You have nausea and are vomiting.    You have fatigue or muscle weakness.    You have hiccups that will not stop.    You have questions or concerns about your condition or care.    How CKD is diagnosed:  CKD has 5 stages. Your healthcare provider will use results from the following tests to find the stage of CKD you have:  Blood and urine tests  show how well your kidneys are working. They may also show the cause of your CKD.    Ultrasound, CT scan, or MRI  pictures may be used to check your kidneys. You may be given contrast liquid to help your kidneys show up better in the pictures. Tell the healthcare provider if you have ever had an allergic reaction to contrast liquid. Do not enter the MRI room with anything metal. Metal  can cause serious injury. Tell the healthcare provider if you have any metal in or on your body.    A biopsy  is a procedure to remove a small piece of tissue from your kidney. It is done to find the cause of your CKD.    Treatment  can help control signs and symptoms, and prevent a worse stage of CKD. Your care team may include specialists, such as a dietitian or a heart specialist. This depends on the stage of your CKD and if you have other health conditions to manage. Healthcare providers will work with you to create a plan based on your decisions for treatment. Your treatment plan may include any of the following:  Medicines  may be given to decrease your blood pressure and get rid of extra fluid. You may also receive medicine to manage health conditions that may occur with CKD, such as anemia, diabetes, and heart disease.    Dialysis  is a treatment to remove chemicals and waste from your blood when your kidneys can no longer do this.    Surgery  may be needed to create an arteriovenous fistula (AVF) in your arm or insert a catheter into your abdomen. This is done so you can receive dialysis.    A kidney transplant  may be done if your CKD becomes severe.    What you can do to manage CKD:  Management may include making some lifestyle changes. Tell your healthcare provider if you have any concerns about being able to make changes. He or she can help you find solutions, including working with specialists. Ask for help creating a plan to break large goals into smaller steps. Your plan may include any of the following:  Manage other health conditions.  Your healthcare provider will work with you to make a care plan that meets your needs. You will be checked regularly for heart disease or other conditions that can make CKD worse, such as diabetes. Your blood pressure will be closely monitored. You will also get a target blood pressure and help making a plan to reach your target. This may include taking your blood  pressure at home.         Maintain a healthy weight.  Your weight and body mass index (BMI) will be checked regularly. BMI helps find if your weight is healthy for your height. Your healthcare provider will use other tests to check your muscle and protein levels. Extra weight can strain your kidneys. A low weight or low muscle mass can make you feel more tired. You may have trouble doing your daily activities. Ask your provider what a healthy weight is for you. He or she can help you create a plan to lose or gain weight safely, if needed. The plan may include keeping a food diary. This is a list of foods and liquids you have each day. Your provider will use the diary to help you make changes, if needed. Changes are based on your health and any other conditions you have, such as diabetes.    Create an exercise plan.  Regular exercise can help you manage CKD, high blood pressure, and diabetes. Exercise also helps control weight. Your provider can help you create exercise goals and a plan to reach those goals. For example, your goal may be to exercise for 30 minutes in a day. Your plan can include breaking exercise into 10 minute sessions, 3 times during the day.         Create a healthy eating plan.  Your provider may tell you to eat food low in potassium, phosphorus, or protein. Your provider may also recommend vitamin or mineral supplements. Do not take any supplements without talking to your provider. A dietitian can help you plan meals if needed. Ask how much liquid to drink each day and which liquids are best for you.         Limit sodium (salt) as directed.  You may need to limit sodium to less than 2,300 milligrams (mg) each day. Ask your dietitian or healthcare provider how much sodium you can have each day. The amount depends on your stage of kidney disease. Table salt, canned foods, soups, salted snacks, and processed meats, like deli meats and sausage, are high in sodium. Your provider or a dietitian can  show you how to read food labels for sodium.    Limit alcohol as directed.  Alcohol can cause fluid retention and can affect your kidneys. Ask how much alcohol is safe for you. A drink of alcohol is 12 ounces of beer, 5 ounces of wine, or 1½ ounces of liquor.    Do not smoke.  Nicotine and other chemicals in cigarettes and cigars can cause kidney damage. Ask your provider for information if you currently smoke and need help to quit. E-cigarettes or smokeless tobacco still contain nicotine. Talk to your provider before you use these products.    Ask about over-the-counter medicines.  Medicines such as NSAIDs and laxatives may harm your kidneys. Some cough and cold medicines can raise your blood pressure. Always ask if a medicine is safe before you take it.    Ask about vaccines you may need.  CKD can increase your risk for infections such as pneumonia, influenza, and hepatitis. Vaccines lower your risk for infection. Your healthcare provider will tell you which vaccines you need and when to get them.    Follow up with your doctor or nephrologist as directed:  You will need to return for tests to monitor your kidney and nerve function, and your parathyroid hormone level. Your medicines may be changed, based on certain test results. Write down your questions so you remember to ask them during your visits.  © Copyright [employer] 2023 Information is for End User's use only and may not be sold, redistributed or otherwise used for commercial purposes.  The above information is an  only. It is not intended as medical advice for individual conditions or treatments. Talk to your doctor, nurse or pharmacist before following any medical regimen to see if it is safe and effective for you.      Low Purine Diet   WHAT YOU NEED TO KNOW:   A low-purine diet is a meal plan based on foods that are low in purine content. Purine is a substance that is found in foods and is produced naturally by the body. Purines are broken  down by the body and changed to uric acid. The kidneys normally filter the uric acid, and it leaves the body through the urine. However, people with gout sometimes have a buildup of uric acid in the blood. This buildup of uric acid can cause swelling and pain (a gout attack). A low-purine diet may help to treat and prevent gout attacks.  DISCHARGE INSTRUCTIONS:   Foods to include:  The following foods are low in purine.  Eggs, nuts, and peanut butter    Low-fat and fat-free cheese and ice cream    Skim or 1% milk    Soup made without meat extract or broth    Vegetables that are not on the medium-purine list below    All fruit and fruit juice    Bread, pasta, rice, cakes, cornbread, and popcorn    Water, soda, tea, coffee, and cocoa    Sugar, sweets, and gelatin    Fat and oil    Foods to limit:   Medium-purine foods:     Meats:  Limit the following to 4 to 6 ounces each day.    Meat and poultry     Crab, lobster, oysters, and shrimp    Vegetables:  Limit the following vegetables to ½ cup each day.    Asparagus    Cauliflower    Spinach    Mushrooms    Green peas    Beans, peas, and lentils (limit to 1 cup each day)    Oats and oatmeal (limit to ? cup uncooked each day)    Wheat germ and bran (limit to ¼ cup each day)    High-purine foods:  Limit or avoid foods high in purine.    Anchovies, sardines, scallops, and mussels    Tuna, codfish, herring, and naveen    Wild game meats, like goose and duck    Organ meats, such as brains, heart, kidney, liver, and sweetbreads    Gravies and sauces made with meat    Yeast extracts taken in the form of a supplement    Other guidelines to follow:   Increase liquid intake.  Drink 8 to 16 (eight-ounce) cups of liquid each day. At least half of the liquid you drink should be water. Liquid can help your body get rid of extra uric acid.     Limit or avoid alcohol.  Alcohol (especially beer) increases your risk of a gout attack. Beer contains a high amount of purine.     Maintain a  healthy weight.  If you are overweight, you should lose weight slowly. Weight loss can help decrease the amount of stress on your joints. Regular exercise can help you lose weight if you are overweight, or maintain your weight if you are at a normal weight. Talk to your healthcare provider before you begin an exercise program.    © Copyright [employer] 2023 Information is for End User's use only and may not be sold, redistributed or otherwise used for commercial purposes.  The above information is an  only. It is not intended as medical advice for individual conditions or treatments. Talk to your doctor, nurse or pharmacist before following any medical regimen to see if it is safe and effective for you.

## 2024-05-10 ENCOUNTER — TELEPHONE (OUTPATIENT)
Age: 69
End: 2024-05-10

## 2024-05-10 NOTE — TELEPHONE ENCOUNTER
Patient called- Lorena had recommended he start Vitamin D3 supplements. He wants to know if she is going to order that or if he can get it OTC? Please advise.    I do not see anything ordered yet.

## 2024-05-10 NOTE — TELEPHONE ENCOUNTER
Called and advised to get vit d3 OTC 6121-4716 u daily, per Lorena. Pt understood and was ok with that.

## 2024-05-17 DIAGNOSIS — R35.1 NOCTURIA: ICD-10-CM

## 2024-05-17 RX ORDER — TAMSULOSIN HYDROCHLORIDE 0.4 MG/1
0.4 CAPSULE ORAL
Qty: 30 CAPSULE | Refills: 5 | Status: SHIPPED | OUTPATIENT
Start: 2024-05-17

## 2024-07-05 ENCOUNTER — TELEPHONE (OUTPATIENT)
Age: 69
End: 2024-07-05

## 2024-07-05 NOTE — TELEPHONE ENCOUNTER
Patient called in to schedule a follow up appointment (TCM) after being admitted to [facility] in [place]. Patient wasn't sure when he was admitted, he thinks it was on 6/16 and discharged 6/20. Attempted to warm transfer to office but was unsuccessful, please give patient a call to schedule  Thank you

## 2024-07-11 ENCOUNTER — TELEPHONE (OUTPATIENT)
Age: 69
End: 2024-07-11

## 2024-07-11 ENCOUNTER — TELEPHONE (OUTPATIENT)
Dept: FAMILY MEDICINE CLINIC | Facility: CLINIC | Age: 69
End: 2024-07-11

## 2024-07-11 NOTE — TELEPHONE ENCOUNTER
Pt called to reschedule his appt because he is not feeling well.  He also requested to have [employer] stop calling him.  While he was explaining that [employer] was a , he suddenly said he wasn't feeling well and we would have to call him back, and then he hung up.  Please call pt.  Could his appt today be converted to a virtual visit if pt is agreeable?

## 2024-07-16 ENCOUNTER — OFFICE VISIT (OUTPATIENT)
Dept: FAMILY MEDICINE CLINIC | Facility: CLINIC | Age: 69
End: 2024-07-16
Payer: MEDICARE

## 2024-07-16 VITALS
SYSTOLIC BLOOD PRESSURE: 138 MMHG | DIASTOLIC BLOOD PRESSURE: 88 MMHG | HEART RATE: 84 BPM | TEMPERATURE: 97.6 F | OXYGEN SATURATION: 99 % | HEIGHT: 73 IN | BODY MASS INDEX: 30.35 KG/M2 | WEIGHT: 229 LBS

## 2024-07-16 DIAGNOSIS — M10.9 ACUTE GOUT OF LEFT HAND, UNSPECIFIED CAUSE: ICD-10-CM

## 2024-07-16 DIAGNOSIS — Z95.1 HX OF CABG: ICD-10-CM

## 2024-07-16 DIAGNOSIS — I48.0 PAROXYSMAL ATRIAL FIBRILLATION (HCC): ICD-10-CM

## 2024-07-16 DIAGNOSIS — I50.20 HFREF (HEART FAILURE WITH REDUCED EJECTION FRACTION) (HCC): Primary | ICD-10-CM

## 2024-07-16 PROCEDURE — 99214 OFFICE O/P EST MOD 30 MIN: CPT | Performed by: FAMILY MEDICINE

## 2024-07-16 PROCEDURE — G2211 COMPLEX E/M VISIT ADD ON: HCPCS | Performed by: FAMILY MEDICINE

## 2024-07-16 RX ORDER — PREDNISONE 20 MG/1
40 TABLET ORAL DAILY
Qty: 10 TABLET | Refills: 0 | Status: SHIPPED | OUTPATIENT
Start: 2024-07-16 | End: 2024-07-21
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
## 2024-07-17 ENCOUNTER — TELEPHONE (OUTPATIENT)
Age: 69
End: 2024-07-17

## 2024-07-17 DIAGNOSIS — M10.9 ACUTE GOUT OF LEFT HAND, UNSPECIFIED CAUSE: Primary | ICD-10-CM

## 2024-07-17 RX ORDER — ALLOPURINOL 100 MG/1
100 TABLET ORAL DAILY
Qty: 60 TABLET | Refills: 0 | Status: SHIPPED | OUTPATIENT
Start: 2024-07-17

## 2024-07-17 NOTE — TELEPHONE ENCOUNTER
Patient called and states if is to remain on Allopurinol 100 mg will need a new script sent to [facility] in [place].

## 2024-07-23 ENCOUNTER — TELEPHONE (OUTPATIENT)
Age: 69
End: 2024-07-23

## 2024-07-23 ENCOUNTER — TELEPHONE (OUTPATIENT)
Dept: NEPHROLOGY | Facility: CLINIC | Age: 69
End: 2024-07-23

## 2024-07-23 DIAGNOSIS — M10.9 ACUTE GOUT, UNSPECIFIED CAUSE, UNSPECIFIED SITE: Primary | ICD-10-CM

## 2024-07-23 NOTE — TELEPHONE ENCOUNTER
Patient called states Prednisone made his sick. Still has swelling on left pointer finger, is very painful. Is using OTC arthritis cream which helps a little bit. Also is taking Allopurinol. Would like to know if there is something else to be taking, Uses [facility] in [place].

## 2024-07-23 NOTE — TELEPHONE ENCOUNTER
Called spoke with patient advised patient to get non-fasting labs done 1 week prior to 08/09/24 scheduled   follow-up appointment with CHIQUITA Carrion . patient verbalized understanding and is okay with it.   lab orders faxed to [facility] [place] Fax # [phone] and [phone]. per patient's request.

## 2024-07-25 DIAGNOSIS — M10.9 ACUTE GOUT OF LEFT HAND, UNSPECIFIED CAUSE: Primary | ICD-10-CM

## 2024-07-25 RX ORDER — COLCHICINE 0.6 MG/1
TABLET ORAL
Qty: 60 TABLET | Refills: 0 | Status: SHIPPED | OUTPATIENT
Start: 2024-07-25

## 2024-07-25 NOTE — TELEPHONE ENCOUNTER
Patient said finger is still red it doesn't hurt as much when he bends it it hurts. . Patient put arthritis  cream on it and skin peeled he said his finger is red around the knuckle and is crooked. Patient has been taking tylenol arthritis 650 mg 2 times a day. Patient is inquiring if the allopurinol should be increased

## 2024-07-29 LAB
25(OH)D3+25(OH)D2 SERPL-MCNC: 24 NG/ML (ref 30–100)
ALBUMIN/CREAT UR: 114.1
ANION GAP SERPL CALCULATED.3IONS-SCNC: 13 MMOL/L (ref 3–11)
BACTERIA URNS QL MICRO: ABNORMAL
BASOPHILS # BLD AUTO: 0.1 THOU/CMM (ref 0–0.1)
BASOPHILS NFR BLD AUTO: 1 %
BUN SERPL-MCNC: 51 MG/DL (ref 7–28)
CALCIUM SERPL-MCNC: 8.8 MG/DL (ref 8.5–10.1)
CHLORIDE SERPL-SCNC: 101 MMOL/L (ref 100–109)
CO2 SERPL-SCNC: 25 MMOL/L (ref 21–31)
CREAT SERPL-MCNC: 2.16 MG/DL (ref 0.53–1.3)
CREAT UR-MCNC: 49.1 MG/DL (ref 50–200)
CYTOLOGY CMNT CVX/VAG CYTO-IMP: ABNORMAL
DIFFERENTIAL METHOD BLD: ABNORMAL
EOSINOPHIL # BLD AUTO: 0.2 THOU/CMM (ref 0–0.5)
EOSINOPHIL NFR BLD AUTO: 2 %
ERYTHROCYTE [DISTWIDTH] IN BLOOD BY AUTOMATED COUNT: 21.6 % (ref 12–16)
GFR/BSA.PRED SERPLBLD CYS-BASED-ARV: 33 ML/MIN/{1.73_M2}
GLUCOSE SERPL-MCNC: 139 MG/DL (ref 65–99)
GLUCOSE UR QL STRIP: NEGATIVE MG/DL
HCT VFR BLD AUTO: 30.4 % (ref 37–48)
HGB BLD-MCNC: 10.1 G/DL (ref 12.5–17)
HGB UR QL STRIP: ABNORMAL MG/DL
IRON SATN MFR SERPL: 15 % (ref 20–50)
IRON SERPL-MCNC: 53 UG/DL (ref 50–212)
KETONES UR QL STRIP: NEGATIVE MG/DL
LEUKOCYTE ESTERASE UR QL STRIP: NEGATIVE /UL
LYMPHOCYTES # BLD AUTO: 1 THOU/CMM (ref 1–3)
LYMPHOCYTES NFR BLD AUTO: 14 %
MCH RBC QN AUTO: 29.5 PG (ref 27–36)
MCHC RBC AUTO-ENTMCNC: 33.4 G/DL (ref 32–37)
MCV RBC AUTO: 89 FL (ref 80–100)
MICROALBUMIN UR-MCNC: 5.6 MG/DL
MONOCYTES # BLD AUTO: 0.4 THOU/CMM (ref 0.3–1)
MONOCYTES NFR BLD AUTO: 5 %
MUCOUS THREADS URNS QL MICRO: ABNORMAL
NEUTROPHILS # BLD AUTO: 5.9 THOU/CMM (ref 1.8–7.8)
NEUTROPHILS NFR BLD AUTO: 78 %
NITRITE UR QL STRIP: NEGATIVE
PH UR: 6 [PH] (ref 4.5–8)
PHOSPHATE SERPL-MCNC: 3.2 MG/DL (ref 2.3–4.6)
PLATELET # BLD AUTO: 341 THOU/CMM (ref 140–350)
PMV BLD REES-ECKER: 7.9 FL (ref 7.5–11.3)
POTASSIUM SERPL-SCNC: 3.8 MMOL/L (ref 3.5–5.2)
PROT 24H UR-MRATE: NEGATIVE MG/DL
PTH-INTACT SERPL-MCNC: 145.7 PG/ML (ref 12–88)
RBC # BLD AUTO: 3.44 MILL/CMM (ref 4–5.4)
RBC #/AREA URNS HPF: ABNORMAL /HPF (ref 0–2)
SL AMB POCT URINE COMMENT: ABNORMAL
SODIUM SERPL-SCNC: 139 MMOL/L (ref 135–145)
SP GR UR: 1.01 (ref 1–1.03)
SQUAMOUS #/AREA URNS HPF: ABNORMAL /LPF (ref 0–5)
TIBC SERPL-MCNC: 363 UG/DL (ref 260–430)
TRANSFERRIN SERPL-MCNC: 259 MG/DL (ref 203–362)
URATE SERPL-MCNC: 11.1 MG/DL (ref 3.5–7)
WBC # BLD AUTO: 7.6 THOU/CMM (ref 4–10.5)
WBC #/AREA URNS HPF: ABNORMAL /HPF (ref 0–5)

## 2024-08-07 NOTE — PROGRESS NOTES
NEPHROLOGY OFFICE NOTE    Patient: John Paul Borjas               Sex: male           YOB: 1955        Age:  68 y.o.       8/9/2024      BACKGROUND     I had the pleasure of seeing John Paul Borjas in the nephrology office today for follow-up. He has a past medical history significant for hypertension, CAD s/p CABG, MAZE procedure, atrial fibrillation, CKD, GERD, BPH, and hyperlipidemia. He is following with our office for management of chronic kidney disease.      He has history of hypertension, currently maintained on losartan, isosorbide, and metoprolol.  Reports his losartan has recently been adjusted to 50 mg daily.     He is an extensive cardiac history and is following with [facility] cardiology. He has underlying severe ischemic cardiomyopathy and currently maintained on torsemide 40 mg once daily.  He is also maintained on metoprolol and isosorbide.     After review of the medical record, it appears that baseline creatinine levels have been fluctuating around 2.0 dating back through 2022.      He reports he previously used NSAIDs on a regular basis for pain management, but had stopped several years back.    Is been having issues with elevated uric acid levels and had a recent gout attack for which she completed treatment with colchicine.  He was prescribed allopurinol 100 mg daily.  He still having an issue with redness and swelling in his left pointer finger.  He does have follow-up with his PCP for reevaluation.    Most recent labs were obtained on 7/29/2024, which we have reviewed together.     SUBJECTIVE     He currently has no complaints at this time and is feeling well. Patient denies any chest pain or shortness of breath.  Does endorse chronic lower extremity edema which he reports is stable at this time.    REVIEW OF SYSTEMS     Review of Systems   Constitutional:  Positive for activity change and fatigue. Negative for chills and fever.   HENT:  Negative for trouble swallowing.    Respiratory:   Negative for cough and shortness of breath.    Cardiovascular:  Positive for leg swelling.   Gastrointestinal:  Negative for nausea and vomiting.   Genitourinary:  Negative for difficulty urinating, dysuria, frequency and hematuria.   Musculoskeletal:  Negative for back pain.        Swelling left pointer finger   Skin:  Negative for pallor.   Neurological:  Negative for dizziness, syncope, weakness and light-headedness.   Psychiatric/Behavioral:  Negative for sleep disturbance. The patient is not nervous/anxious.        OBJECTIVE     Current Weight: Weight - Scale: 107 kg (235 lb)  Vitals:    08/09/24 1417   BP: 140/66   Pulse: 82   Resp: 16   Temp: (!) 97.2 °F (36.2 °C)   SpO2: 99%     Body mass index is 31 kg/m².    CURRENT MEDICATIONS       Current Outpatient Medications:     allopurinol (ZYLOPRIM) 100 mg tablet, Take 1 tablet (100 mg total) by mouth daily, Disp: 60 tablet, Rfl: 0    atorvastatin (LIPITOR) 80 mg tablet, Take 80 mg by mouth every evening, Disp: , Rfl:     busPIRone (BUSPAR) 5 mg tablet, Take 5 mg by mouth 2 (two) times a day, Disp: , Rfl:     finasteride (PROSCAR) 5 mg tablet, take 1 tablet by mouth once daily, Disp: 90 tablet, Rfl: 1    isosorbide mononitrate (IMDUR) 30 mg 24 hr tablet, Take 90 mg by mouth daily, Disp: , Rfl:     losartan (COZAAR) 25 mg tablet, Take 25 mg by mouth daily, Disp: , Rfl:     metoprolol succinate (TOPROL-XL) 50 mg 24 hr tablet, Take 150 mg by mouth daily, Disp: , Rfl:     nitroglycerin (NITROSTAT) 0.4 mg SL tablet, Place 1 tablet under the tongue every 5 (five) minutes as needed, Disp: , Rfl:     tamsulosin (FLOMAX) 0.4 mg, take 1 capsule by mouth daily with dinner, Disp: 30 capsule, Rfl: 5    ticagrelor (BRILINTA) 90 MG, Take 90 mg by mouth 2 (two) times a day, Disp: , Rfl:     torsemide (DEMADEX) 20 mg tablet, Take 40 mg by mouth daily, Disp: , Rfl:     warfarin (COUMADIN) 5 mg tablet, take 1 AND 1/2 to 2 tablets by mouth daily as directed, Disp: , Rfl:     warfarin  (COUMADIN) 7.5 mg tablet, Take 7.5 mg by mouth daily, Disp: , Rfl:     colchicine (COLCRYS) 0.6 mg tablet, Take 2 tablets on day 1 and then take 1 tablet twice a day for 1 week (Patient not taking: Reported on 8/9/2024), Disp: 60 tablet, Rfl: 0    PHYSICAL EXAMINATION     Physical Exam  Vitals reviewed.   Constitutional:       General: He is not in acute distress.  HENT:      Head: Normocephalic.      Mouth/Throat:      Lips: Pink.      Mouth: Mucous membranes are moist.   Eyes:      General: Lids are normal. No scleral icterus.  Cardiovascular:      Rate and Rhythm: Normal rate and regular rhythm.      Heart sounds: S1 normal and S2 normal.   Pulmonary:      Effort: Pulmonary effort is normal. No accessory muscle usage or respiratory distress.      Breath sounds: Normal breath sounds.   Abdominal:      General: There is no distension.      Tenderness: There is no abdominal tenderness.   Musculoskeletal:      Cervical back: Normal range of motion and neck supple. No tenderness.      Right lower leg: Edema present.      Left lower leg: Edema present.   Skin:     General: Skin is warm.      Coloration: Skin is not cyanotic or jaundiced.      Comments: Redness and swelling of left pointer finger, denies pain and site cool to touch.   Neurological:      General: No focal deficit present.      Mental Status: He is alert and oriented to person, place, and time.   Psychiatric:         Attention and Perception: Attention normal.         Speech: Speech normal.         Behavior: Behavior is cooperative.           LAB RESULTS     [facility] 7/29/2024:  Sodium 139  Potassium 3.8  Chloride 101  CO2 25  BUN 51  Creatinine 2.16  Glucose 139  Calcium 8.8  eGFR 33      RADIOLOGY RESULTS      CT Renal Stone Study 8/14/2022:  There is no adrenal mass. Kidneys have normal size and contour. There is some contrast material in both kidneys. No hydronephrosis. Ureters are not dilated.   No retroperitoneal adenopathy. Appendix is posterior and  appears normal. Bladder distends smoothly with contrast. There is some fat stranding in the left and right inguinal region.     There is no mesenteric edema. No ascites or free air. No bowel obstruction.   There are sigmoid diverticula. No diverticulitis. The lumbar spine shows no compression fracture. Bony pelvis is intact.       ASSESSMENT/PLAN     Chronic kidney disease stage IIIb/A2:  After review of the medical record, it appears that baseline creatinine levels have been fluctuating around 2.0 dating back through 2022. Current creatinine level is 2.16 with an eGFR 33.     Prior CT imaging and renal US in August 2022 noted no renal mass or evidence of obstruction. He is maintained on tamsulosin and finasteride.      Etiology of CKD multifactorial in the setting of hypertensive nephrosclerosis, prior NSAID use, and cardiorenal syndrome type II. Suspect creatinine levels will fluctuate based on need for diuretic.     Patient is clinically euvolemic on examination I advised that he stay hydrated within set fluid restriction from his cardiologist.  Continue to avoid use of nephrotoxic agent such as NSAIDs.  Will recheck CKD labs prior to follow-up.     Hypertension:   Currently maintained on losartan 50 mg daily and metoprolol 150 mg daily.  Blood pressure overall acceptable.  Discussed the importance of low-salt diet and monitoring blood pressure readings at home.     Cardiomyopathy:  Most recent echocardiogram on 7/20/2023 revealed an ejection fraction of 25 to 30% with global hypokinesis.  He is following with [facility] cardiology, currently maintained on torsemide 40 mg daily.  Clinically appears euvolemic on examination with chronic lower extremity swelling which he states is stable.     Vitamin D deficiency, secondary hyperparathyroidism of renal origin:  Calcium 8.8, phosphorous 3.2, .7, and vitamin D 24.  Patient reports he is not actively taking vitamin D supplement and I advised to resume cholecalciferol  "2000 units once daily.     Proteinuria:  Most recent urine microalbumin to creatinine ratio 114.1, no significant hematuria on most recent urinalysis. Maintained on losartan, which was recently increased to 50 mg daily.  Will continue regimen and repeat labs prior to follow-up.     Hyperuricemia:  Uric acid level elevated at 11.1. Suspect etiology multifactorial in the setting of chronic kidney disease and diuretic use. Maintained on allopurinol 100 mg daily and recently receiving dose of prednisone and colchicine for acute gout attack.    Patient still has redness and swelling of left pointer finger and does have follow-up with his PCP for further management.  Advise he stay hydrated and continue to follow low purine diet.     Anemia:  Most recent hemoglobin was 10.1 and within acceptable range in the setting of underlying chronic kidney disease. Iron panel replete.      Recommend nephrology follow-up in 6 months with provider.    CHIQUITA Kaur  Nephrology  8/9/2024      Portions of the record may have been created with voice recognition software. Occasional wrong word or \"sound a like\" substitutions may have occurred due to the inherent limitations of voice recognition software. Read the chart carefully and recognize, using context, where substitutions have occurred.   " Albuterol BID two days prior to DOS.

## 2024-08-08 ENCOUNTER — TELEPHONE (OUTPATIENT)
Dept: NEPHROLOGY | Facility: CLINIC | Age: 69
End: 2024-08-08

## 2024-08-08 NOTE — TELEPHONE ENCOUNTER
Patient has an appointment schedule for tomorrow 08/09/2024 with CHIQUITA Carrion I called patients Insurance [facility] @ [phone] to verify eligibility for the patient and per customer service patient's insurance is paid until 08/01/2024. I called patient and spoke with him and he stated that his insurance is paid every monthly dates ranges from 7/15/2024 to 08/15/2024..

## 2024-08-09 ENCOUNTER — OFFICE VISIT (OUTPATIENT)
Dept: NEPHROLOGY | Facility: CLINIC | Age: 69
End: 2024-08-09
Payer: MEDICARE

## 2024-08-09 ENCOUNTER — TELEPHONE (OUTPATIENT)
Dept: NEPHROLOGY | Facility: CLINIC | Age: 69
End: 2024-08-09

## 2024-08-09 VITALS
WEIGHT: 235 LBS | OXYGEN SATURATION: 99 % | TEMPERATURE: 97.2 F | HEIGHT: 73 IN | SYSTOLIC BLOOD PRESSURE: 140 MMHG | BODY MASS INDEX: 31.14 KG/M2 | RESPIRATION RATE: 16 BRPM | DIASTOLIC BLOOD PRESSURE: 66 MMHG | HEART RATE: 82 BPM

## 2024-08-09 DIAGNOSIS — N25.81 SECONDARY HYPERPARATHYROIDISM OF RENAL ORIGIN (HCC): ICD-10-CM

## 2024-08-09 DIAGNOSIS — E55.9 VITAMIN D DEFICIENCY: ICD-10-CM

## 2024-08-09 DIAGNOSIS — N18.32 STAGE 3B CHRONIC KIDNEY DISEASE (HCC): Primary | ICD-10-CM

## 2024-08-09 DIAGNOSIS — I50.20 HFREF (HEART FAILURE WITH REDUCED EJECTION FRACTION) (HCC): ICD-10-CM

## 2024-08-09 DIAGNOSIS — I10 PRIMARY HYPERTENSION: ICD-10-CM

## 2024-08-09 DIAGNOSIS — E79.0 HYPERURICEMIA: ICD-10-CM

## 2024-08-09 PROCEDURE — 99214 OFFICE O/P EST MOD 30 MIN: CPT

## 2024-08-09 NOTE — LETTER
August 9, 2024     Hadley Vargas MD  [address] [facility]   [address] [place] [id number]    Patient: John Paul Borjas   YOB: 1955   Date of Visit: 8/9/2024       Dear Dr. Vargas:    Thank you for referring John Paul Borjas to me for evaluation. Below are my notes for this consultation.    If you have questions, please do not hesitate to call me. I look forward to following your patient along with you.         Sincerely,        CHIQUITA Kaur        CC: No Recipients    CHIQUITA Kaur  8/9/2024  3:04 PM  Sign when Signing Visit  NEPHROLOGY OFFICE NOTE    Patient: John Paul Borjas               Sex: male           YOB: 1955        Age:  68 y.o.       8/9/2024      BACKGROUND     I had the pleasure of seeing John Paul Borjas in the nephrology office today for follow-up. He has a past medical history significant for hypertension, CAD s/p CABG, MAZE procedure, atrial fibrillation, CKD, GERD, BPH, and hyperlipidemia. He is following with our office for management of chronic kidney disease.      He has history of hypertension, currently maintained on losartan, isosorbide, and metoprolol.  Reports his losartan has recently been adjusted to 50 mg daily.     He is an extensive cardiac history and is following with [facility] cardiology. He has underlying severe ischemic cardiomyopathy and currently maintained on torsemide 40 mg once daily.  He is also maintained on metoprolol and isosorbide.     After review of the medical record, it appears that baseline creatinine levels have been fluctuating around 2.0 dating back through 2022.      He reports he previously used NSAIDs on a regular basis for pain management, but had stopped several years back.    Is been having issues with elevated uric acid levels and had a recent gout attack for which she completed treatment with colchicine.  He was prescribed allopurinol 100 mg daily.  He still having an issue with redness and swelling in his  left pointer finger.  He does have follow-up with his PCP for reevaluation.    Most recent labs were obtained on 7/29/2024, which we have reviewed together.     SUBJECTIVE     He currently has no complaints at this time and is feeling well. Patient denies any chest pain or shortness of breath.  Does endorse chronic lower extremity edema which he reports is stable at this time.    REVIEW OF SYSTEMS     Review of Systems   Constitutional:  Positive for activity change and fatigue. Negative for chills and fever.   HENT:  Negative for trouble swallowing.    Respiratory:  Negative for cough and shortness of breath.    Cardiovascular:  Positive for leg swelling.   Gastrointestinal:  Negative for nausea and vomiting.   Genitourinary:  Negative for difficulty urinating, dysuria, frequency and hematuria.   Musculoskeletal:  Negative for back pain.        Swelling left pointer finger   Skin:  Negative for pallor.   Neurological:  Negative for dizziness, syncope, weakness and light-headedness.   Psychiatric/Behavioral:  Negative for sleep disturbance. The patient is not nervous/anxious.        OBJECTIVE     Current Weight: Weight - Scale: 107 kg (235 lb)  Vitals:    08/09/24 1417   BP: 140/66   Pulse: 82   Resp: 16   Temp: (!) 97.2 °F (36.2 °C)   SpO2: 99%     Body mass index is 31 kg/m².    CURRENT MEDICATIONS       Current Outpatient Medications:   •  allopurinol (ZYLOPRIM) 100 mg tablet, Take 1 tablet (100 mg total) by mouth daily, Disp: 60 tablet, Rfl: 0  •  atorvastatin (LIPITOR) 80 mg tablet, Take 80 mg by mouth every evening, Disp: , Rfl:   •  busPIRone (BUSPAR) 5 mg tablet, Take 5 mg by mouth 2 (two) times a day, Disp: , Rfl:   •  finasteride (PROSCAR) 5 mg tablet, take 1 tablet by mouth once daily, Disp: 90 tablet, Rfl: 1  •  isosorbide mononitrate (IMDUR) 30 mg 24 hr tablet, Take 90 mg by mouth daily, Disp: , Rfl:   •  losartan (COZAAR) 25 mg tablet, Take 25 mg by mouth daily, Disp: , Rfl:   •  metoprolol succinate  (TOPROL-XL) 50 mg 24 hr tablet, Take 150 mg by mouth daily, Disp: , Rfl:   •  nitroglycerin (NITROSTAT) 0.4 mg SL tablet, Place 1 tablet under the tongue every 5 (five) minutes as needed, Disp: , Rfl:   •  tamsulosin (FLOMAX) 0.4 mg, take 1 capsule by mouth daily with dinner, Disp: 30 capsule, Rfl: 5  •  ticagrelor (BRILINTA) 90 MG, Take 90 mg by mouth 2 (two) times a day, Disp: , Rfl:   •  torsemide (DEMADEX) 20 mg tablet, Take 40 mg by mouth daily, Disp: , Rfl:   •  warfarin (COUMADIN) 5 mg tablet, take 1 AND 1/2 to 2 tablets by mouth daily as directed, Disp: , Rfl:   •  warfarin (COUMADIN) 7.5 mg tablet, Take 7.5 mg by mouth daily, Disp: , Rfl:   •  colchicine (COLCRYS) 0.6 mg tablet, Take 2 tablets on day 1 and then take 1 tablet twice a day for 1 week (Patient not taking: Reported on 8/9/2024), Disp: 60 tablet, Rfl: 0    PHYSICAL EXAMINATION     Physical Exam  Vitals reviewed.   Constitutional:       General: He is not in acute distress.  HENT:      Head: Normocephalic.      Mouth/Throat:      Lips: Pink.      Mouth: Mucous membranes are moist.   Eyes:      General: Lids are normal. No scleral icterus.  Cardiovascular:      Rate and Rhythm: Normal rate and regular rhythm.      Heart sounds: S1 normal and S2 normal.   Pulmonary:      Effort: Pulmonary effort is normal. No accessory muscle usage or respiratory distress.      Breath sounds: Normal breath sounds.   Abdominal:      General: There is no distension.      Tenderness: There is no abdominal tenderness.   Musculoskeletal:      Cervical back: Normal range of motion and neck supple. No tenderness.      Right lower leg: Edema present.      Left lower leg: Edema present.   Skin:     General: Skin is warm.      Coloration: Skin is not cyanotic or jaundiced.      Comments: Redness and swelling of left pointer finger, denies pain and site cool to touch.   Neurological:      General: No focal deficit present.      Mental Status: He is alert and oriented to  person, place, and time.   Psychiatric:         Attention and Perception: Attention normal.         Speech: Speech normal.         Behavior: Behavior is cooperative.           LAB RESULTS     [facility] 7/29/2024:  Sodium 139  Potassium 3.8  Chloride 101  CO2 25  BUN 51  Creatinine 2.16  Glucose 139  Calcium 8.8  eGFR 33      RADIOLOGY RESULTS      CT Renal Stone Study 8/14/2022:  There is no adrenal mass. Kidneys have normal size and contour. There is some contrast material in both kidneys. No hydronephrosis. Ureters are not dilated.   No retroperitoneal adenopathy. Appendix is posterior and appears normal. Bladder distends smoothly with contrast. There is some fat stranding in the left and right inguinal region.     There is no mesenteric edema. No ascites or free air. No bowel obstruction.   There are sigmoid diverticula. No diverticulitis. The lumbar spine shows no compression fracture. Bony pelvis is intact.       ASSESSMENT/PLAN     Chronic kidney disease stage IIIb/A2:  After review of the medical record, it appears that baseline creatinine levels have been fluctuating around 2.0 dating back through 2022. Current creatinine level is 2.16 with an eGFR 33.     Prior CT imaging and renal US in August 2022 noted no renal mass or evidence of obstruction. He is maintained on tamsulosin and finasteride.      Etiology of CKD multifactorial in the setting of hypertensive nephrosclerosis, prior NSAID use, and cardiorenal syndrome type II. Suspect creatinine levels will fluctuate based on need for diuretic.     Patient is clinically euvolemic on examination I advised that he stay hydrated within set fluid restriction from his cardiologist.  Continue to avoid use of nephrotoxic agent such as NSAIDs.  Will recheck CKD labs prior to follow-up.     Hypertension:   Currently maintained on losartan 50 mg daily and metoprolol 150 mg daily.  Blood pressure overall acceptable.  Discussed the importance of low-salt diet and monitoring  "blood pressure readings at home.     Cardiomyopathy:  Most recent echocardiogram on 7/20/2023 revealed an ejection fraction of 25 to 30% with global hypokinesis.  He is following with [facility] cardiology, currently maintained on torsemide 40 mg daily.  Clinically appears euvolemic on examination with chronic lower extremity swelling which he states is stable.     Vitamin D deficiency, secondary hyperparathyroidism of renal origin:  Calcium 8.8, phosphorous 3.2, .7, and vitamin D 24.  Patient reports he is not actively taking vitamin D supplement and I advised to resume cholecalciferol 2000 units once daily.     Proteinuria:  Most recent urine microalbumin to creatinine ratio 114.1, no significant hematuria on most recent urinalysis. Maintained on losartan, which was recently increased to 50 mg daily.  Will continue regimen and repeat labs prior to follow-up.     Hyperuricemia:  Uric acid level elevated at 11.1. Suspect etiology multifactorial in the setting of chronic kidney disease and diuretic use. Maintained on allopurinol 100 mg daily and recently receiving dose of prednisone and colchicine for acute gout attack.    Patient still has redness and swelling of left pointer finger and does have follow-up with his PCP for further management.  Advise he stay hydrated and continue to follow low purine diet.     Anemia:  Most recent hemoglobin was 10.1 and within acceptable range in the setting of underlying chronic kidney disease. Iron panel replete.      Recommend nephrology follow-up in 6 months with provider.    CHIQUITA Kaur  Nephrology  8/9/2024      Portions of the record may have been created with voice recognition software. Occasional wrong word or \"sound a like\" substitutions may have occurred due to the inherent limitations of voice recognition software. Read the chart carefully and recognize, using context, where substitutions have occurred.   "

## 2024-08-09 NOTE — PATIENT INSTRUCTIONS
"Drink water within the fluid restriction set by your Cardiologist.   Start vitamin D supplement (cholecalciferol or D3) 2000 units once daily.  Avoid NSAIDs (e.g., Ibuprofen, Motrin, Aleve, Naproxen, Advil)   Follow with your family doctor for management of your elevated uric acid levels.   You can try tart cherry juice as well to help with high uric acid levels.   We will see you in 6 months for follow-up. Please get your blood and urine studies prior to follow-up.     Patient Education     Chronic kidney disease   The Basics   Written by the doctors and editors at [facility]   What is chronic kidney disease? -- Chronic kidney disease, or \"CKD,\" is when the kidneys stop working as well as they should. When they are working normally, the kidneys filter blood and remove waste and excess salt and water (figure 1).  In people with CKD, the kidneys slowly lose the ability to filter blood. In time, the kidneys can stop working completely. That is why it is so important to keep CKD from getting worse.  What are the symptoms of CKD? -- At first, CKD causes no symptoms. As the disease gets worse, it can:   Make your feet, ankles, or legs swell (called \"edema\")   Give you high blood pressure   Make you very tired   Damage your bones  Will I need tests? -- Yes. Your doctor will want to see you regularly. You will probably have appointments at least once a year, and you will get regular tests to check your kidneys. These include blood and urine tests.  If your CKD gets worse over time, you will probably need to see a \"nephrologist.\" This is a doctor who takes care of people with kidney disease.  Is there anything I can do to keep my kidneys from getting worse? -- Yes. If you have CKD, you can protect your kidneys if you:   Take all of your prescribed medicines every day, and follow all of your doctor's instructions for how to take them.   Keep your blood sugar in a healthy range, if you have diabetes.   Change your diet, if your " "doctor or nurse recommends to. They might suggest working with a dietitian (nutrition expert).   Quit smoking, if you smoke.   Lose weight, if you have excess body weight.   Avoid medicines that can harm the kidneys - One example is nonsteroidal antiinflammatory drugs (\"NSAIDs\"). These medicines include ibuprofen (sample brand names: Advil, Motrin) and naproxen (sample brand name: Aleve). There are other medicines that people with CKD need to avoid, too. Check with your doctor, nurse, or kidney specialist before starting any new medicines or supplements, even those you can buy without a prescription.  How is CKD treated? -- People in the early stages of CKD can take medicines to keep the disease from getting worse. For example, many people with CKD should take medicines known as \"ACE inhibitors\" or \"angiotensin receptor blockers.\" If your doctor or nurse prescribes these medicines, it is very important that you take them every day as directed. If they cause side effects or cost too much, tell your doctor or nurse. They might have solutions to offer.  What happens if my kidneys stop working completely? -- If your kidneys can no longer filter blood properly, you can choose between 3 different treatments to take over their job. Your choices are:   Kidney transplant - After transplant surgery, the new kidney can do the job of your own kidneys. If you have a kidney transplant, you will need to take medicines for the rest of your life to keep your body from reacting badly to the new kidney. (You only need 1 kidney to live.)   Hemodialysis - This is a procedure in which a dialysis machine takes over the job of the kidneys. The machine pumps blood out of the body, filters it, and returns it to the body. If you choose hemodialysis, you will need to be hooked up to the machine at least 3 times a week for several hours for the rest of your life. Before you start, you will also need to have surgery to prepare a blood vessel for " attachment to the machine.   Peritoneal dialysis - This involves piping a special fluid into the belly every day. If you choose peritoneal dialysis, you will need surgery to have a tube implanted in your belly. Then, you will have to learn how to pipe the fluid in and out through that tube.  How do I choose between the different treatment options? -- You and your doctor will need to work together to find a treatment that's right for you. Kidney transplant surgery is usually the best option for most people. But often, there are no kidneys available for transplant.  Ask your doctor to explain all of your options and how they might work for you. Then, talk openly with them about how you feel about all of the options. You might even decide that you do not want any treatment. That is your choice.  All topics are updated as new evidence becomes available and our peer review process is complete.  This topic retrieved from [employer] on: May 18, 2024.  Topic 43037 Version 34.0  Release: 32.4.3 - C32.137  © 2024 UpToDate, Inc. and/or its affiliates. All rights reserved.  figure 1: Anatomy of the urinary tract     Urine is made by the kidneys. It passes from the kidneys into the bladder through 2 tubes called the ureters. Then, it leaves the bladder through another tube called the urethra.  Graphic 86944 Version 8.0  Consumer Information Use and Disclaimer   Disclaimer: This generalized information is a limited summary of diagnosis, treatment, and/or medication information. It is not meant to be comprehensive and should be used as a tool to help the user understand and/or assess potential diagnostic and treatment options. It does NOT include all information about conditions, treatments, medications, side effects, or risks that may apply to a specific patient. It is not intended to be medical advice or a substitute for the medical advice, diagnosis, or treatment of a health care provider based on the health care provider's  examination and assessment of a patient's specific and unique circumstances. Patients must speak with a health care provider for complete information about their health, medical questions, and treatment options, including any risks or benefits regarding use of medications. This information does not endorse any treatments or medications as safe, effective, or approved for treating a specific patient. UpToDate, Inc. and its affiliates disclaim any warranty or liability relating to this information or the use thereof.The use of this information is governed by the Terms of Use, available at https://www.woltersMoveInSyncuwer.com/en/know/clinical-effectiveness-terms. 2024© UpToDate, Inc. and its affiliates and/or licensors. All rights reserved.  Copyright   © 2024 UpToDate, Inc. and/or its affiliates. All rights reserved.

## 2024-09-02 DIAGNOSIS — M10.9 ACUTE GOUT OF LEFT HAND, UNSPECIFIED CAUSE: ICD-10-CM

## 2024-09-03 RX ORDER — COLCHICINE 0.6 MG/1
TABLET ORAL
Qty: 60 TABLET | Refills: 2 | Status: SHIPPED | OUTPATIENT
Start: 2024-09-03

## 2024-09-11 ENCOUNTER — TRANSITIONAL CARE MANAGEMENT (OUTPATIENT)
Dept: FAMILY MEDICINE CLINIC | Facility: CLINIC | Age: 69
End: 2024-09-11

## 2024-09-12 DIAGNOSIS — M10.9 ACUTE GOUT OF LEFT HAND, UNSPECIFIED CAUSE: ICD-10-CM

## 2024-09-12 RX ORDER — ALLOPURINOL 100 MG/1
100 TABLET ORAL DAILY
Qty: 60 TABLET | Refills: 0 | Status: SHIPPED | OUTPATIENT
Start: 2024-09-12 | End: 2024-09-17 | Stop reason: SDUPTHER

## 2024-09-17 DIAGNOSIS — M10.9 ACUTE GOUT OF LEFT HAND, UNSPECIFIED CAUSE: ICD-10-CM

## 2024-09-17 RX ORDER — ALLOPURINOL 100 MG/1
100 TABLET ORAL DAILY
Qty: 90 TABLET | Refills: 1 | Status: SHIPPED | OUTPATIENT
Start: 2024-09-17

## 2024-09-18 ENCOUNTER — OFFICE VISIT (OUTPATIENT)
Dept: FAMILY MEDICINE CLINIC | Facility: CLINIC | Age: 69
End: 2024-09-18
Payer: MEDICARE

## 2024-09-18 VITALS
SYSTOLIC BLOOD PRESSURE: 142 MMHG | DIASTOLIC BLOOD PRESSURE: 78 MMHG | OXYGEN SATURATION: 97 % | HEART RATE: 87 BPM | BODY MASS INDEX: 28.86 KG/M2 | WEIGHT: 217.8 LBS | TEMPERATURE: 97.8 F | HEIGHT: 73 IN

## 2024-09-18 DIAGNOSIS — I50.20 HFREF (HEART FAILURE WITH REDUCED EJECTION FRACTION) (HCC): ICD-10-CM

## 2024-09-18 DIAGNOSIS — S06.4XAA EPIDURAL HEMATOMA (HCC): ICD-10-CM

## 2024-09-18 DIAGNOSIS — E04.1 THYROID NODULE: ICD-10-CM

## 2024-09-18 DIAGNOSIS — S14.109A SPINAL CORD INJURY, CERVICAL REGION, INITIAL ENCOUNTER (HCC): ICD-10-CM

## 2024-09-18 DIAGNOSIS — I48.0 PAROXYSMAL ATRIAL FIBRILLATION (HCC): Primary | ICD-10-CM

## 2024-09-18 DIAGNOSIS — Z98.890 S/P LAMINECTOMY: ICD-10-CM

## 2024-09-18 PROCEDURE — 99496 TRANSJ CARE MGMT HIGH F2F 7D: CPT | Performed by: FAMILY MEDICINE

## 2024-09-18 RX ORDER — OXYCODONE HYDROCHLORIDE 5 MG/1
5 TABLET ORAL EVERY 12 HOURS PRN
Qty: 60 TABLET | Refills: 0 | Status: SHIPPED | OUTPATIENT
Start: 2024-09-18

## 2024-09-18 RX ORDER — OXYCODONE HYDROCHLORIDE 5 MG/1
TABLET ORAL
COMMUNITY
Start: 2024-09-11 | End: 2024-09-18 | Stop reason: SDUPTHER

## 2024-09-18 RX ORDER — GABAPENTIN 100 MG/1
100 CAPSULE ORAL
Qty: 30 CAPSULE | Refills: 0 | Status: SHIPPED | OUTPATIENT
Start: 2024-09-18

## 2024-09-18 RX ORDER — SENNOSIDES 8.6 MG
8.6 TABLET ORAL
COMMUNITY
Start: 2024-09-11 | End: 2024-10-11

## 2024-09-18 RX ORDER — LIDOCAINE 4 G/G
1 PATCH TOPICAL DAILY
COMMUNITY
Start: 2024-09-11

## 2024-09-18 RX ORDER — ASPIRIN 81 MG/1
81 TABLET, CHEWABLE ORAL DAILY
COMMUNITY
Start: 2024-09-11 | End: 2025-09-11

## 2024-09-18 NOTE — PROGRESS NOTES
Transition of Care Visit  Name: John Paul Borjas      : 1955      MRN: [id number]  Encounter Provider: Hadley Vargas MD  Encounter Date: 2024   Encounter department: [facility]    Assessment & Plan  Paroxysmal atrial fibrillation (HCC)    HFrEF (heart failure with reduced ejection fraction) (HCC)  Wt Readings from Last 3 Encounters:   24 98.8 kg (217 lb 12.8 oz)   24 107 kg (235 lb)   24 104 kg (229 lb)     Epidural hematoma (HCC)    Orders:    oxyCODONE (ROXICODONE) 5 immediate release tablet; Take 1 tablet (5 mg total) by mouth every 12 (twelve) hours as needed for moderate pain or severe pain Max Daily Amount: 10 mg    gabapentin (Neurontin) 100 mg capsule; Take 1 capsule (100 mg total) by mouth daily at bedtime    S/P laminectomy    Orders:    oxyCODONE (ROXICODONE) 5 immediate release tablet; Take 1 tablet (5 mg total) by mouth every 12 (twelve) hours as needed for moderate pain or severe pain Max Daily Amount: 10 mg    gabapentin (Neurontin) 100 mg capsule; Take 1 capsule (100 mg total) by mouth daily at bedtime    Thyroid nodule    Orders:    TSH, 3rd generation with Free T4 reflex; Future    US thyroid; Future    TSH, 3rd generation with Free T4 reflex      Follow up with neurosurgery for post op  PT and activity per neurosurgery  Will refill oxycodone for pain control, 5mg BID  Baseline pain repair, Tylenol 500 mg 2 tablets every 8-12 hours scheduled  Use oxycodone for breakthrough pain  Start patient on gabapentin 100 mg at night for pain control    Will obtain TSH with T4 and ultrasound of the thyroid after neck brace is removed to follow-up on thyroid nodule    Agree with cardiology the patient should be off anticoagulation medication at this time    Right-sided hip/buttock pain likely secondary to muscle spasm, there is improvement after standing, if there is significant change such as swelling, worsening numbness and tingling, weakness in the right lower  extremity may consider repeat CT/MRI for evaluation  A repeat hematoma in the hip is less likely as patient is now off anticoagulation medication    Spinal cord injury, cervical region, initial encounter (HCC)              History of Present Illness       Transitional Care Management Review:   John Paul Borjas is a 68 y.o. male here for TCM follow up.     During the TCM phone call patient stated:  TCM Call       Date and time call was made  9/12/2024  8:36 AM    Hospital care reviewed  Records reviewed    Patient was hospitialized at  [facility]    Date of Admission  08/22/24    Date of discharge  09/11/24    Diagnosis  Spinal cord injury, cervical region, initial encounter (HCC) (Primary Dx);  DE LA VEGA (dyspnea on exertion);  ACC/AHA stage C systolic heart failure due to ischemic cardiomyopathy    Disposition  Home; Home health services    Were the patients medications reviewed and updated  Yes          TCM Call       Should patient be enrolled in anticoag monitoring?  No    Scheduled for follow up?  Yes    Patients specialists  Cardiologist    Did you obtain your prescribed medications  Yes    Do you need help managing your prescriptions or medications  No    Is transportation to your appointment needed  No    I have advised the patient to call PCP with any new or worsening symptoms  Raina Thomas MA    Living Arrangements  Children    Are you recieving any outpatient services  Yes    Are you recieving home care services  Yes    Types of home care services  Home health aid    Are you using any community resources  No    Current waiver services  No    Interperter language line needed  No    Counseling  Patient    Counseling topics  Importance of RX compliance          CHAN    John Paul is a 68-year-old male patient here for transitional care visit after her hospitalization at [facility].  Patient was seen at [facility]'s emergency room on 8/13 2024 for abdominal pain  "presenting to the back.  Patient had MRI completed which shows epidural hematoma of the spine.  Patient was seen by neurosurgery on 8/14/2024 by neurosurgery team.    Cervical lumbar x-ray shows large intensity epidural hematoma involving the entirety of the cervical spine, thoracic spine lower thoracic spine, lumbar spine with significant spinal cord compression diffusely.    Patient was seen by cardiology afterwards and was recommended to continue aspirin but stop anticoagulation with Coumadin and Brilinta.  Has outpatient follow-up with cardiology planned.    Pt is seeing neurosurgery team on 9/25/24.     Patient continues to have weakness and numbness involving the bilateral lower extremity.  Patient has been trying to pursuing physical therapy however has not seen improvement that he was hoping for.    Incidental findings:  Thyroid nodule 1.5cm        Review of Systems   Constitutional:  Negative for chills and fever.   HENT:  Negative for congestion.    Respiratory:  Negative for shortness of breath.    Cardiovascular:  Negative for chest pain.   Gastrointestinal:  Negative for abdominal pain.   Musculoskeletal:  Positive for arthralgias, back pain, gait problem and neck pain.        Left sided hip pain, feels similar to the epidural hematoma    Neurological:  Positive for weakness and numbness.       Objective     /78 (BP Location: Left arm, Patient Position: Sitting, Cuff Size: Large)   Pulse 87   Temp 97.8 °F (36.6 °C) (Temporal)   Ht 6' 1\" (1.854 m)   Wt 98.8 kg (217 lb 12.8 oz)   SpO2 97%   BMI 28.74 kg/m²     Physical Exam  Vitals reviewed.   Constitutional:       Appearance: Normal appearance.      Comments: In a neck brace, in moderate pain   Cardiovascular:      Rate and Rhythm: Normal rate.      Pulses: Normal pulses.   Pulmonary:      Effort: Pulmonary effort is normal. No respiratory distress.      Breath sounds: Normal breath sounds.   Abdominal:      General: Abdomen is flat. " "  Musculoskeletal:      Comments: No significant overlying skin changes, nodularity, erythema on the right buttocks at the site of the pain     Skin:     Capillary Refill: Capillary refill takes less than 2 seconds.      Comments: Surgical scar noted on the cervical thoracic and lumbar spine  There is still some scabbing over the lumbar spine surgical scar  Gentle pressure does not elucidate any discharges  On the very top of the cervical spine scar there is a retained staple, removed     Neurological:      Mental Status: He is alert.   Psychiatric:         Mood and Affect: Mood normal.         Medications have been reviewed by provider in current encounter    Hadley Vargas M.D.  Family Medicine    Please excuse any \"sound-alike\" errors that may have ocurred during the process of dictation. Parts of this note have been dictated and there may be errors present in the transcription process. Thank you.          "

## 2024-09-18 NOTE — ASSESSMENT & PLAN NOTE
Wt Readings from Last 3 Encounters:   09/18/24 98.8 kg (217 lb 12.8 oz)   08/09/24 107 kg (235 lb)   07/16/24 104 kg (229 lb)

## 2024-09-20 LAB
DME PARACHUTE DELIVERY DATE ACTUAL: NORMAL
DME PARACHUTE DELIVERY DATE REQUESTED: NORMAL
DME PARACHUTE ITEM DESCRIPTION: NORMAL
DME PARACHUTE ITEM DESCRIPTION: NORMAL
DME PARACHUTE ORDER STATUS: NORMAL
DME PARACHUTE SUPPLIER NAME: NORMAL
DME PARACHUTE SUPPLIER PHONE: NORMAL

## 2024-10-06 DIAGNOSIS — M10.9 ACUTE GOUT OF LEFT HAND, UNSPECIFIED CAUSE: ICD-10-CM

## 2024-10-09 RX ORDER — COLCHICINE 0.6 MG/1
TABLET ORAL
Qty: 180 TABLET | Refills: 2 | Status: SHIPPED | OUTPATIENT
Start: 2024-10-09

## 2024-11-26 ENCOUNTER — TRANSITIONAL CARE MANAGEMENT (OUTPATIENT)
Dept: FAMILY MEDICINE CLINIC | Facility: CLINIC | Age: 69
End: 2024-11-26

## 2024-11-27 ENCOUNTER — TELEPHONE (OUTPATIENT)
Age: 69
End: 2024-11-27

## 2024-11-27 NOTE — TELEPHONE ENCOUNTER
PT states he spoke to someone in the office that told him he can change his appt to Friday 11/29 instead of 12/4. I was unable to reach the office. Please reach out to PT, per his request today if possible.    Please advise    ThankYou

## 2024-12-04 ENCOUNTER — OFFICE VISIT (OUTPATIENT)
Dept: FAMILY MEDICINE CLINIC | Facility: CLINIC | Age: 69
End: 2024-12-04
Payer: MEDICARE

## 2024-12-04 VITALS
SYSTOLIC BLOOD PRESSURE: 140 MMHG | HEART RATE: 86 BPM | OXYGEN SATURATION: 99 % | BODY MASS INDEX: 28.94 KG/M2 | DIASTOLIC BLOOD PRESSURE: 100 MMHG | WEIGHT: 218.4 LBS | TEMPERATURE: 97.2 F | HEIGHT: 73 IN

## 2024-12-04 DIAGNOSIS — R10.9 LEFT FLANK PAIN: ICD-10-CM

## 2024-12-04 DIAGNOSIS — I50.20 HFREF (HEART FAILURE WITH REDUCED EJECTION FRACTION) (HCC): ICD-10-CM

## 2024-12-04 DIAGNOSIS — R10.84 GENERALIZED ABDOMINAL PAIN: Primary | ICD-10-CM

## 2024-12-04 DIAGNOSIS — N17.9 AKI (ACUTE KIDNEY INJURY) (HCC): ICD-10-CM

## 2024-12-04 DIAGNOSIS — M62.838 MUSCLE SPASM: ICD-10-CM

## 2024-12-04 PROCEDURE — 99495 TRANSJ CARE MGMT MOD F2F 14D: CPT | Performed by: FAMILY MEDICINE

## 2024-12-04 RX ORDER — METHOCARBAMOL 500 MG/1
500 TABLET, FILM COATED ORAL 3 TIMES DAILY PRN
Qty: 60 TABLET | Refills: 0 | Status: SHIPPED | OUTPATIENT
Start: 2024-12-04

## 2024-12-04 RX ORDER — METHOCARBAMOL 500 MG/1
TABLET, FILM COATED ORAL
COMMUNITY
Start: 2024-11-26 | End: 2024-12-04 | Stop reason: SDUPTHER

## 2024-12-04 RX ORDER — BISACODYL 10 MG
SUPPOSITORY, RECTAL RECTAL
COMMUNITY
Start: 2024-09-11

## 2024-12-04 RX ORDER — HYDRALAZINE HYDROCHLORIDE 25 MG/1
25 TABLET, FILM COATED ORAL 3 TIMES DAILY
COMMUNITY
Start: 2024-11-26 | End: 2024-12-04

## 2024-12-04 NOTE — PROGRESS NOTES
Transition of Care Visit  Name: John Paul Borjas      : 1955      MRN: [id number]  Encounter Provider: Hadley Vargas MD  Encounter Date: 2024   Encounter department: [facility]    Assessment & Plan  Generalized abdominal pain    Left flank pain    KAELYN (acute kidney injury) ([facility])    HFrEF (heart failure with reduced ejection fraction) ([facility])  Wt Readings from Last 3 Encounters:   24 99.1 kg (218 lb 6.4 oz)   24 98.8 kg (217 lb 12.8 oz)   24 107 kg (235 lb)     Etiology of patient's left flank pain is still unclear  Differential diagnosis includes referred pain from left pleural effusion, musculoskeletal pain, spontaneous bacterial peritonitis, referred pain from the kidney/missed kidney stone    Patient has discontinued losartan and spironolactone  Recommend starting hydralazine for blood pressure control    Obtain CBC, BMP, BNP for eval.    Follow up in 2-4 weeks if no improvement  Muscle spasm    Orders:    methocarbamol (ROBAXIN) 500 mg tablet; Take 1 tablet (500 mg total) by mouth 3 (three) times a day as needed for muscle spasms         History of Present Illness     Transitional Care Management Review:   John Paul Borjas is a 69 y.o. male here for TCM follow up.     During the TCM phone call patient stated:  TCM Call       Date and time call was made  2024  8:34 AM    Hospital care reviewed  Records reviewed    Patient was hospitialized at  [facility]    Date of Admission  24    Date of discharge  24    Diagnosis  Left flank pain (Primary Dx);  KAELYN (acute kidney injury) ([facility]);  Chronic heart failure with reduced ejection fraction (HFrEF, <= 40%) ([facility]);    Disposition  Home    Were the patients medications reviewed and updated  Yes          TCM Call       Should patient be enrolled in anticoag monitoring?  No    Scheduled for follow up?  Yes    Patients specialists  Urologist; Cardiologist    Did you obtain your prescribed medications   Yes    Do you need help managing your prescriptions or medications  No    Is transportation to your appointment needed  No    I have advised the patient to call PCP with any new or worsening symptoms  Raina Thomas MA    Living Arrangements  Children    Are you recieving any outpatient services  No    Are you recieving home care services  No    Types of home care services  Home health aid    Are you using any community resources  No    Current waiver services  No    Have you fallen in the last 12 months  No    Interperter language line needed  No    Counseling  Patient    Counseling topics  Importance of RX compliance          CHAN Coker is a 69-year-old male patient presents for transitional care visit after recent hospitalization at [facility].  Patient was seen and evaluated under observation for left-sided flank pain.  On blood work, patient was found to be in KAELYN, baseline creatinine increased from 1.5-1 0.7-2 0.2-2.3.  Patient's medication including losartan and spironolactone was discontinued, substitute was hydralazine.  Patient is no longer taking losartan, has not started hydralazine.  Blood pressure today is elevated 140/100.    Patient continues to have significant pain involving the left flank.  Reports putting pressure on certain areas that seems to help alleviate the pain.  Patient reports oxycodone does seem to help with the pain, Robaxin does seem to help.     CT renal stone study involving abdomen pelvis completed.  Patient does have small left pleural effusion, evidence of Angelique lithiasis without inflammation or biliary duct dilation.  Spleen is not enlarged..  No evidence of hydronephrosis or renal calculi.  Patient does have calcification of the abdominal aorta, infrarenal AAA 3.4 cm, increased from 3.0 cm on 8/14/2022.    Pt states he still have about 4-6/10 pain.       Review of Systems   Constitutional:  Negative for chills and fever.   HENT:  Negative for congestion.   "  Respiratory:  Positive for shortness of breath.    Cardiovascular:  Negative for chest pain.   Gastrointestinal:  Negative for abdominal pain.   Genitourinary:  Negative for dysuria and hematuria.         Objective   /100 (BP Location: Left arm, Patient Position: Sitting, Cuff Size: Standard)   Pulse 86   Temp (!) 97.2 °F (36.2 °C) (Temporal)   Ht 6' 1\" (1.854 m)   Wt 99.1 kg (218 lb 6.4 oz)   SpO2 99%   BMI 28.81 kg/m²     Physical Exam  Vitals reviewed.   Constitutional:       General: He is not in acute distress.     Appearance: Normal appearance.      Comments: Pt appears to be in pain    Cardiovascular:      Rate and Rhythm: Normal rate.      Pulses: Normal pulses.   Pulmonary:      Comments: Shallow breathing   Abdominal:      General: There is distension.      Tenderness: There is abdominal tenderness. There is right CVA tenderness, left CVA tenderness and rebound.      Comments: On physical exam to the left upper quadrant and lower quadrant seems to be more distended compared with her stomach, diffuse tenderness throughout the abdomen, light palpation involving all 4 quadrants causes pain more significant in the left lower quadrant, patient reports rebound tenderness   Musculoskeletal:      Right lower leg: Edema present.      Left lower leg: Edema present.      Comments: Trace LE edema noted bilaterally    Skin:     General: Skin is warm.      Capillary Refill: Capillary refill takes less than 2 seconds.   Neurological:      General: No focal deficit present.      Mental Status: He is alert.   Psychiatric:         Mood and Affect: Mood normal.           Medications have been reviewed by provider in current encounter    "

## 2024-12-04 NOTE — ASSESSMENT & PLAN NOTE
Wt Readings from Last 3 Encounters:   12/04/24 99.1 kg (218 lb 6.4 oz)   09/18/24 98.8 kg (217 lb 12.8 oz)   08/09/24 107 kg (235 lb)     Etiology of patient's left flank pain is still unclear  Differential diagnosis includes referred pain from left pleural effusion, musculoskeletal pain, spontaneous bacterial peritonitis, referred pain from the kidney/missed kidney stone    Patient has discontinued losartan and spironolactone  Recommend starting hydralazine for blood pressure control    Obtain CBC, BMP, BNP for eval.    Follow up in 2-4 weeks if no improvement

## 2024-12-05 LAB
ANION GAP SERPL CALCULATED.3IONS-SCNC: 12 MMOL/L (ref 3–11)
BASOPHILS # BLD AUTO: 0.1 THOU/CMM (ref 0–0.1)
BASOPHILS NFR BLD AUTO: 1 %
BNP SERPL-MCNC: 1687 PG/ML (ref 0–100)
BUN SERPL-MCNC: 42 MG/DL (ref 7–28)
CALCIUM SERPL-MCNC: 9.6 MG/DL (ref 8.5–10.5)
CHLORIDE SERPL-SCNC: 103 MMOL/L (ref 100–109)
CO2 SERPL-SCNC: 27 MMOL/L (ref 21–31)
CREAT SERPL-MCNC: 1.85 MG/DL (ref 0.53–1.3)
CYTOLOGY CMNT CVX/VAG CYTO-IMP: ABNORMAL
DIFFERENTIAL METHOD BLD: ABNORMAL
EOSINOPHIL # BLD AUTO: 0.4 THOU/CMM (ref 0–0.5)
EOSINOPHIL NFR BLD AUTO: 5 %
ERYTHROCYTE [DISTWIDTH] IN BLOOD BY AUTOMATED COUNT: 18 % (ref 12–16)
GFR/BSA.PRED SERPLBLD CYS-BASED-ARV: 39 ML/MIN/{1.73_M2}
GLUCOSE SERPL-MCNC: 92 MG/DL (ref 65–99)
HCT VFR BLD AUTO: 37.4 % (ref 37–48)
HGB BLD-MCNC: 12.5 G/DL (ref 12.5–17)
LYMPHOCYTES # BLD AUTO: 1.7 THOU/CMM (ref 1–3)
LYMPHOCYTES NFR BLD AUTO: 18 %
MCH RBC QN AUTO: 29.5 PG (ref 27–36)
MCHC RBC AUTO-ENTMCNC: 33.3 G/DL (ref 32–37)
MCV RBC AUTO: 89 FL (ref 80–100)
MONOCYTES # BLD AUTO: 0.6 THOU/CMM (ref 0.3–1)
MONOCYTES NFR BLD AUTO: 7 %
NEUTROPHILS # BLD AUTO: 6.3 THOU/CMM (ref 1.8–7.8)
NEUTROPHILS NFR BLD AUTO: 69 %
PLATELET # BLD AUTO: 327 THOU/CMM (ref 140–350)
PMV BLD REES-ECKER: 8.7 FL (ref 7.5–11.3)
POTASSIUM SERPL-SCNC: 4.1 MMOL/L (ref 3.5–5.2)
RBC # BLD AUTO: 4.22 MILL/CMM (ref 4–5.4)
SODIUM SERPL-SCNC: 142 MMOL/L (ref 135–145)
WBC # BLD AUTO: 9.1 THOU/CMM (ref 4–10.5)

## 2024-12-06 ENCOUNTER — RESULTS FOLLOW-UP (OUTPATIENT)
Dept: FAMILY MEDICINE CLINIC | Facility: CLINIC | Age: 69
End: 2024-12-06

## 2024-12-06 DIAGNOSIS — R60.0 BILATERAL LEG EDEMA: Primary | ICD-10-CM

## 2024-12-06 DIAGNOSIS — R10.9 LEFT FLANK PAIN: ICD-10-CM

## 2024-12-06 DIAGNOSIS — R19.00 ABDOMINAL SWELLING: ICD-10-CM

## 2024-12-09 ENCOUNTER — TELEPHONE (OUTPATIENT)
Age: 69
End: 2024-12-09

## 2024-12-09 NOTE — TELEPHONE ENCOUNTER
"Pt called to make Dr Vargas aware [facility] will be sending over images to go along with the CT abdomen report that Dr Vargas had seen.  Pt is asking if he needs to have the CT abdomen done again at [facility] or if those [facility] results will suffice.  Also, he said he thinks the \"bump\" is getting harder and he is still in a lot of pain.  Does he have to wait until his appt in January or is there something else Dr Vargas can recommend for the pain?  Please advise.  "

## 2024-12-11 NOTE — TELEPHONE ENCOUNTER
Patient called asking if someone is able to help with obtaining his imaging from :  Denae Doyle, DO   [address]   [address] [place] [address]   Phone: [phone]   Fax: [phone]     He states he called [facility] medical records and has not heard back since the call on Monday. I see all of the notes from the imaging, just not the images itself.

## 2024-12-12 ENCOUNTER — TELEPHONE (OUTPATIENT)
Age: 69
End: 2024-12-12

## 2024-12-12 NOTE — TELEPHONE ENCOUNTER
Caller from the  [facility] said she was asked by the PT to call Dr Vargas's office to request that Dr Vargas's office request his CT scan images from  med records so Dr Vargas could have it.     I did inform caller that a signature from PT may very well be needed, and she did say she inform PT about this, however Pt said he was too busy. I again informed her PT has an upcoming appt in Jan, so maybe he could sign a med release info form then.     Caller said he may not have the info on hand when he comes to the appt, so she left the info to request the info, which is  medical records phone : [phone] option 2   Fax# [phone].    Please advise    Jennifer

## 2024-12-13 DIAGNOSIS — R14.0 ABDOMINAL BLOATING: Primary | ICD-10-CM

## 2024-12-13 DIAGNOSIS — R10.12 LUQ PAIN: ICD-10-CM

## 2024-12-22 DIAGNOSIS — N40.0 BENIGN PROSTATIC HYPERPLASIA, UNSPECIFIED WHETHER LOWER URINARY TRACT SYMPTOMS PRESENT: ICD-10-CM

## 2024-12-24 DIAGNOSIS — M62.838 MUSCLE SPASM: ICD-10-CM

## 2024-12-24 RX ORDER — FINASTERIDE 5 MG/1
5 TABLET, FILM COATED ORAL DAILY
Qty: 90 TABLET | Refills: 1 | Status: SHIPPED | OUTPATIENT
Start: 2024-12-24

## 2024-12-26 RX ORDER — METHOCARBAMOL 500 MG/1
500 TABLET, FILM COATED ORAL 3 TIMES DAILY PRN
Qty: 60 TABLET | Refills: 0 | Status: SHIPPED | OUTPATIENT
Start: 2024-12-26

## 2025-01-24 ENCOUNTER — NURSE TRIAGE (OUTPATIENT)
Dept: OTHER | Facility: OTHER | Age: 70
End: 2025-01-24

## 2025-01-24 ENCOUNTER — OFFICE VISIT (OUTPATIENT)
Dept: FAMILY MEDICINE CLINIC | Facility: CLINIC | Age: 70
End: 2025-01-24
Payer: MEDICARE

## 2025-01-24 VITALS
DIASTOLIC BLOOD PRESSURE: 82 MMHG | OXYGEN SATURATION: 100 % | HEIGHT: 73 IN | BODY MASS INDEX: 30.46 KG/M2 | HEART RATE: 86 BPM | TEMPERATURE: 97.2 F | WEIGHT: 229.8 LBS | SYSTOLIC BLOOD PRESSURE: 126 MMHG

## 2025-01-24 DIAGNOSIS — F32.0 CURRENT MILD EPISODE OF MAJOR DEPRESSIVE DISORDER, UNSPECIFIED WHETHER RECURRENT (HCC): ICD-10-CM

## 2025-01-24 DIAGNOSIS — R10.32 LLQ PAIN: ICD-10-CM

## 2025-01-24 DIAGNOSIS — F41.9 ANXIETY: Primary | ICD-10-CM

## 2025-01-24 PROCEDURE — G2211 COMPLEX E/M VISIT ADD ON: HCPCS | Performed by: FAMILY MEDICINE

## 2025-01-24 PROCEDURE — 99214 OFFICE O/P EST MOD 30 MIN: CPT | Performed by: FAMILY MEDICINE

## 2025-01-24 RX ORDER — BUSPIRONE HYDROCHLORIDE 5 MG/1
5 TABLET ORAL 2 TIMES DAILY
Qty: 180 TABLET | Refills: 1 | Status: SHIPPED | OUTPATIENT
Start: 2025-01-24

## 2025-01-24 RX ORDER — SERTRALINE HYDROCHLORIDE 25 MG/1
25 TABLET, FILM COATED ORAL
Qty: 90 TABLET | Refills: 1 | Status: SHIPPED | OUTPATIENT
Start: 2025-01-24 | End: 2025-07-23

## 2025-01-24 NOTE — TELEPHONE ENCOUNTER
Regarding: SOB/cough  ----- Message from Stephania JUNE sent at 1/24/2025  6:40 AM EST -----  Patient stating he did not sleep well last night.  He is having SOB and cough.  He stated his anxiety is getting worse.   I did make an apt with Dr. Vargas today at patient's request at 12:15.  Please call to triage.  Thank you

## 2025-01-24 NOTE — PROGRESS NOTES
Name: John Paul Borjas      : 1955      MRN: [id number]  Encounter Provider: Hadley Vargas MD  Encounter Date: 2025   Encounter department: [facility]    Assessment & Plan  Anxiety  Stemming from his health condition.  Patient did have chronic back pain, recent cardiovascular issue.  He was on BuSpar in the past for anxiety, will restart this medication.  Will start Zoloft to help with anxiety control and sleep  Orders:    busPIRone (BUSPAR) 5 mg tablet; Take 1 tablet (5 mg total) by mouth 2 (two) times a day    sertraline (ZOLOFT) 25 mg tablet; Take 1 tablet (25 mg total) by mouth daily at bedtime    LLQ pain  Chronic issue, mild improvement, still having pain and burning sensation on the lateral LLQ.        Current mild episode of major depressive disorder, unspecified whether recurrent (HCC)  Progressive symptoms with on PHQ 9  Patient on Zoloft for treatment         History of Present Illness       Cough  Pertinent negatives include no chest pain, chills, fever, headaches, rhinorrhea or sore throat.   Anxiety  Symptoms include nervous/anxious behavior. Patient reports no chest pain or dizziness.           Pt did have a fall back in November after his hospitalization, landed between 2 cars. States he was feeling good after physical therapy and embarrassed about falling.     Regards to patient's anxiety, she has a history of similar symptoms.  Was prescribed BuSpar in the past and reports this medication has worked.  Patient has been out of medication and is interested in restarting the medication.  Has prescribed for 90 tablets back in May 2024.    Pt states his does have panic attacks and due to his cardiovascular history, lead to many ER visits     Review of Systems   Constitutional:  Negative for chills and fever.   HENT:  Negative for congestion, rhinorrhea and sore throat.    Respiratory:  Positive for cough.    Cardiovascular:  Negative for chest pain.   Gastrointestinal:   Positive for abdominal pain.   Neurological:  Negative for dizziness, light-headedness and headaches.   Psychiatric/Behavioral:  Positive for sleep disturbance. The patient is nervous/anxious.        Past Medical History:   Diagnosis Date    Coronary artery disease 3/12/2024    GERD (gastroesophageal reflux disease) 3/12/2024    Gout     H/O three vessel coronary artery bypass     Hypertension 3/12/2024    Myocardial infarction (HCC)      Past Surgical History:   Procedure Laterality Date    CORONARY ANGIOPLASTY WITH STENT PLACEMENT      CORONARY ARTERY BYPASS GRAFT       Family History   Problem Relation Age of Onset    Stroke Mother     Heart disease Brother      Social History     Tobacco Use    Smoking status: Former     Types: Cigars     Passive exposure: Current    Smokeless tobacco: Former   Vaping Use    Vaping status: Never Used   Substance and Sexual Activity    Alcohol use: Not Currently    Drug use: Not Currently     Types: Marijuana    Sexual activity: Not on file     Current Outpatient Medications on File Prior to Visit   Medication Sig    allopurinol (ZYLOPRIM) 100 mg tablet Take 1 tablet (100 mg total) by mouth daily (Patient taking differently: Take 100 mg by mouth if needed)    aspirin 81 mg chewable tablet Chew 81 mg daily    atorvastatin (LIPITOR) 80 mg tablet Take 80 mg by mouth every evening    finasteride (PROSCAR) 5 mg tablet take 1 tablet by mouth once daily    isosorbide mononitrate (IMDUR) 30 mg 24 hr tablet Take 90 mg by mouth daily    Lidocaine 4 % PTCH Place 1 patch on the skin daily    losartan (COZAAR) 25 mg tablet Take 25 mg by mouth daily    methocarbamol (ROBAXIN) 500 mg tablet take 1 tablet by mouth three times a day if needed for muscle spasm    metoprolol succinate (TOPROL-XL) 50 mg 24 hr tablet Take 150 mg by mouth daily    nitroglycerin (NITROSTAT) 0.4 mg SL tablet Place 1 tablet under the tongue every 5 (five) minutes as needed    torsemide (DEMADEX) 20 mg tablet Take 40 mg  "by mouth daily    bisacodyl (DULCOLAX) 10 mg suppository insert 1 suppository rectally daily if needed for constipation (Patient not taking: Reported on 1/24/2025)    colchicine (COLCRYS) 0.6 mg tablet take 2 tablets by mouth on day 1 then take 1 tablet by mouth twice a day for 1 week (Patient not taking: Reported on 1/24/2025)    gabapentin (Neurontin) 100 mg capsule Take 1 capsule (100 mg total) by mouth daily at bedtime (Patient not taking: Reported on 1/24/2025)    oxyCODONE (ROXICODONE) 5 immediate release tablet Take 1 tablet (5 mg total) by mouth every 12 (twelve) hours as needed for moderate pain or severe pain Max Daily Amount: 10 mg (Patient not taking: Reported on 1/24/2025)    tamsulosin (FLOMAX) 0.4 mg take 1 capsule by mouth daily with dinner (Patient not taking: Reported on 1/24/2025)    warfarin (COUMADIN) 5 mg tablet take 1 AND 1/2 to 2 tablets by mouth daily as directed (Patient not taking: Reported on 9/18/2024)    warfarin (COUMADIN) 7.5 mg tablet Take 7.5 mg by mouth daily (Patient not taking: Reported on 9/18/2024)    [DISCONTINUED] busPIRone (BUSPAR) 5 mg tablet Take 5 mg by mouth 2 (two) times a day (Patient not taking: Reported on 1/24/2025)     No Known Allergies  Immunization History   Administered Date(s) Administered    COVID-19 MODERNA VACC 0.5 ML IM 03/25/2021, 04/22/2021, 01/05/2022     Objective   /82 (BP Location: Right arm, Patient Position: Sitting, Cuff Size: Large)   Pulse 86   Temp (!) 97.2 °F (36.2 °C) (Temporal)   Ht 6' 1\" (1.854 m)   Wt 104 kg (229 lb 12.8 oz)   SpO2 100%   BMI 30.32 kg/m²     Physical Exam  Vitals reviewed.   Constitutional:       General: He is not in acute distress.     Appearance: Normal appearance. He is obese. He is not ill-appearing, toxic-appearing or diaphoretic.   Cardiovascular:      Rate and Rhythm: Normal rate and regular rhythm.      Pulses: Normal pulses.   Pulmonary:      Effort: Pulmonary effort is normal.   Abdominal:      " Comments: Left abdomen, tenderness over the left lower lateral area   Musculoskeletal:         General: No swelling.   Skin:     General: Skin is warm and dry.      Capillary Refill: Capillary refill takes less than 2 seconds.      Coloration: Skin is not jaundiced.   Neurological:      General: No focal deficit present.      Mental Status: He is alert and oriented to person, place, and time.   Psychiatric:         Mood and Affect: Mood normal.

## 2025-01-24 NOTE — TELEPHONE ENCOUNTER
"Reason for Disposition  • [1] Longstanding difficulty breathing AND [2] not responding to usual therapy    Answer Assessment - Initial Assessment Questions  1. RESPIRATORY STATUS: \"Describe your breathing?\" (e.g., wheezing, shortness of breath, unable to speak, severe coughing)       \"I'm a cardiac patient with a long history with [facility], you guys should have all that information too. I feel terrible, I got no sleep. I got a back operation and my back is killing me. And anxiety. When I lay down, it just builds and I have to sit up.\"    2. ONSET: \"When did this breathing problem begin?\"       Worse overnight     3. PATTERN \"Does the difficult breathing come and go, or has it been constant since it started?\"       Constant  Worse laying down     4. SEVERITY: \"How bad is your breathing?\" (e.g., mild, moderate, severe)       Legs very swollen     5. RECURRENT SYMPTOM: \"Have you had difficulty breathing before?\" If Yes, ask: \"When was the last time?\" and \"What happened that time?\"       Ongoing CHF     6. CARDIAC HISTORY: \"Do you have any history of heart disease?\" (e.g., heart attack, angina, bypass surgery, angioplasty)       Takes 40 mg torsemide x 3 days, did that.     7. LUNG HISTORY: \"Do you have any history of lung disease?\"  (e.g., pulmonary embolus, asthma, emphysema)      Denies     8. CAUSE: \"What do you think is causing the breathing problem?\"       CHF exacerbation     9. OTHER SYMPTOMS: \"Do you have any other symptoms?\" (e.g., chest pain, cough, dizziness, fever, runny nose)      Dry cough   Dizziness  Denies chest pain  \"I have trouble opening my eyes\"    Protocols used: Breathing Difficulty-Adult-AH    "

## 2025-01-27 ENCOUNTER — TELEPHONE (OUTPATIENT)
Dept: NEPHROLOGY | Facility: CLINIC | Age: 70
End: 2025-01-27

## 2025-02-07 ENCOUNTER — TELEPHONE (OUTPATIENT)
Dept: NEPHROLOGY | Facility: CLINIC | Age: 70
End: 2025-02-07

## 2025-02-07 NOTE — TELEPHONE ENCOUNTER
I called and spoke with patient reminded patient to have non fasting lab work done prior to appointment for Tuesday 02/11/2025 with CHIQUITA Carrion. Per patient he stated he will be going today 02/07/2025 to have lab work done

## 2025-02-11 ENCOUNTER — TELEPHONE (OUTPATIENT)
Age: 70
End: 2025-02-11

## 2025-02-11 NOTE — TELEPHONE ENCOUNTER
Pt called he is very sick with stomach bug cancelled todays appt needs a soon appt with a . He stated please call  no appts till july

## 2025-02-11 NOTE — TELEPHONE ENCOUNTER
I called and spoke to the patient and tried to schedule a virtual appointment for Thursday 2/13/2025 with either Lorena or Dr. DAVIDSON Thompson but the patient stated that he had 2 other appointments and he was not able to make it. I did schedule the patient for Tuesday 4/15/2025 with Dr. DAVIDSON Thompson. The patient understood and was okay with it.

## 2025-02-12 ENCOUNTER — OFFICE VISIT (OUTPATIENT)
Dept: FAMILY MEDICINE CLINIC | Facility: CLINIC | Age: 70
End: 2025-02-12
Payer: MEDICARE

## 2025-02-12 ENCOUNTER — TELEPHONE (OUTPATIENT)
Age: 70
End: 2025-02-12

## 2025-02-12 ENCOUNTER — APPOINTMENT (OUTPATIENT)
Dept: RADIOLOGY | Facility: MEDICAL CENTER | Age: 70
End: 2025-02-12
Payer: MEDICARE

## 2025-02-12 VITALS
WEIGHT: 232 LBS | TEMPERATURE: 98.2 F | DIASTOLIC BLOOD PRESSURE: 86 MMHG | OXYGEN SATURATION: 96 % | BODY MASS INDEX: 30.75 KG/M2 | HEIGHT: 73 IN | SYSTOLIC BLOOD PRESSURE: 128 MMHG | HEART RATE: 108 BPM

## 2025-02-12 DIAGNOSIS — R68.89 FLU-LIKE SYMPTOMS: Primary | ICD-10-CM

## 2025-02-12 DIAGNOSIS — R06.02 SOB (SHORTNESS OF BREATH): ICD-10-CM

## 2025-02-12 DIAGNOSIS — K52.9 GASTROENTERITIS: ICD-10-CM

## 2025-02-12 DIAGNOSIS — R68.89 FLU-LIKE SYMPTOMS: ICD-10-CM

## 2025-02-12 LAB
SL AMB POCT RAPID FLU A: NORMAL
SL AMB POCT RAPID FLU B: NORMAL

## 2025-02-12 PROCEDURE — 71046 X-RAY EXAM CHEST 2 VIEWS: CPT

## 2025-02-12 PROCEDURE — 87804 INFLUENZA ASSAY W/OPTIC: CPT | Performed by: FAMILY MEDICINE

## 2025-02-12 PROCEDURE — G2211 COMPLEX E/M VISIT ADD ON: HCPCS | Performed by: FAMILY MEDICINE

## 2025-02-12 PROCEDURE — 99212 OFFICE O/P EST SF 10 MIN: CPT | Performed by: FAMILY MEDICINE

## 2025-02-12 NOTE — PROGRESS NOTES
Outpatient Progress Note  Name: John Paul Borjas      : 1955      MRN: [id number]  Encounter Provider: Hadley Vargas MD  Encounter Date: 2025   Encounter department: [facility]    Assessment & Plan  Flu-like symptoms  No evidence of influenza pneumonia at this time, lungs are clear to auscultation    Orders:  •  POCT rapid flu A and B  •  XR chest pa and lateral; Future    SOB (shortness of breath)  Shortness of breath and some pain with deep inspiration, concern for pneumonia  Start patient on Augmentin, obtain chest x-ray  Orders:  •  XR chest pa and lateral; Future  •  amoxicillin-clavulanate (AUGMENTIN) 875-125 mg per tablet; Take 1 tablet by mouth every 12 (twelve) hours for 7 days    Gastroenteritis  [place] diet, small amount of food and water at a time  If you have having difficulty keeping food and water down if you notice you have not made any urine in 12 hours please report to the ER for IV hydration       Disposition:     I have spent a total time of 15 minutes on the day of the encounter for this patient including          Encounter provider: Hadley Vargas MD     Provider located at: [address]  [facility] [address] [phone]     Recent Visits  No visits were found meeting these conditions.  Showing recent visits within past 7 days and meeting all other requirements  Today's Visits  Date Type Provider Dept   25 Office Visit Hadley Vargas MD [facility]   Showing today's visits and meeting all other requirements  Future Appointments  No visits were found meeting these conditions.  Showing future appointments within next 150 days and meeting all other requirements    History of Present Illness      Subjective:   John Paul Borjas is a 69 y.o. male who is concerned about COVID-19. Patient's symptoms include fatigue, malaise, rhinorrhea, cough, abdominal pain, nausea and vomiting (dry heaving). Patient denies fever,  "chills, congestion, sore throat, anosmia, loss of taste, shortness of breath, chest tightness, diarrhea, myalgias and headaches.     - Date of symptom onset: 2/8/2025      COVID-19 vaccination status: Fully vaccinated with booster    Exposure:   Contact with a person who is under investigation (PUI) for or who is positive for COVID-19 within the last 14 days?: No    Hospitalized recently for fever and/or lower respiratory symptoms?: No      Currently a healthcare worker that is involved in direct patient care?: No      Works in a special setting where the risk of COVID-19 transmission may be high? (this may include long-term care, correctional and [facility] facilities; homeless shelters; assisted-living facilities and group homes.): No      Resident in a special setting where the risk of COVID-19 transmission may be high? (this may include long-term care, correctional and [facility] facilities; homeless shelters; assisted-living facilities and group homes.): No      Decreased appetite  No fever   Sipping on water but throwing that up as well  Pain with deep inspiration     Lab Results   Component Value Date    SARSCOV2 Negative 03/05/2021       Review of Systems   Constitutional:  Positive for fatigue. Negative for chills and fever.   HENT:  Positive for rhinorrhea. Negative for congestion and sore throat.    Respiratory:  Positive for cough. Negative for chest tightness and shortness of breath.    Gastrointestinal:  Positive for abdominal pain, nausea and vomiting (dry heaving). Negative for diarrhea.   Musculoskeletal:  Negative for myalgias.   Neurological:  Negative for headaches.     Objective   /86 (BP Location: Left arm, Patient Position: Sitting, Cuff Size: Standard)   Pulse (!) 108   Temp 98.2 °F (36.8 °C)   Ht 6' 1\" (1.854 m)   Wt 105 kg (232 lb)   SpO2 96%   BMI 30.61 kg/m²     Physical Exam  Vitals reviewed.   Constitutional:       General: He is not in acute distress.     Appearance: Normal " "appearance. He is not ill-appearing, toxic-appearing or diaphoretic.   Cardiovascular:      Rate and Rhythm: Normal rate.      Pulses: Normal pulses.   Pulmonary:      Effort: Pulmonary effort is normal.   Abdominal:      General: Abdomen is flat. Bowel sounds are normal.   Musculoskeletal:         General: No swelling or deformity.   Skin:     General: Skin is warm and dry.      Capillary Refill: Capillary refill takes less than 2 seconds.      Coloration: Skin is not jaundiced.   Neurological:      General: No focal deficit present.      Mental Status: He is alert.   Psychiatric:         Mood and Affect: Mood normal.     Hadley Vargas M.D.  Family Medicine    Please excuse any \"sound-alike\" errors that may have ocurred during the process of dictation. Parts of this note have been dictated and there may be errors present in the transcription process. Thank you.    "

## 2025-02-12 NOTE — TELEPHONE ENCOUNTER
Pt called in stating a week ago he stated with abdominal pain on the left side, nausea, vomiting, body aches and nasal congestion. Pt states that he was seen in office last month for a lump in he left side of his abdomen that has decreased. Pt unsure if he has the flu or if it is related to the lump. Pt has f/u next week. Please advise.

## 2025-02-20 ENCOUNTER — RESULTS FOLLOW-UP (OUTPATIENT)
Dept: FAMILY MEDICINE CLINIC | Facility: CLINIC | Age: 70
End: 2025-02-20

## 2025-02-20 DIAGNOSIS — J92.9 PLEURAL THICKENING: ICD-10-CM

## 2025-02-20 DIAGNOSIS — R93.89 ABNORMAL CXR: Primary | ICD-10-CM

## 2025-02-20 NOTE — TELEPHONE ENCOUNTER
----- Message from Hadley Vargas MD sent at 2/20/2025  9:36 AM EST -----  Evidence of localized effusion or pleural thickening along the lower left lateral chest wall  Will repeat chest x-ray in 4 weeks after resolution of flulike symptoms to determine if this is coming from inflammation or some other source

## 2025-02-20 NOTE — TELEPHONE ENCOUNTER
Pt returned missed call.  Relayed result note from Dr Vargas.  Pt understood and had no questions at this time.  He is aware the xray order is in the system and the expected date is 3/14/25.  He asked to reschedule his appt to after he has the xray so Dr Vargas can review it at the appt.  It was moved to 3/20/25.

## 2025-03-19 ENCOUNTER — HOSPITAL ENCOUNTER (OUTPATIENT)
Dept: RADIOLOGY | Facility: MEDICAL CENTER | Age: 70
Discharge: HOME | End: 2025-03-19
Payer: MEDICARE

## 2025-03-19 DIAGNOSIS — R14.0 ABDOMINAL BLOATING: ICD-10-CM

## 2025-03-19 DIAGNOSIS — R10.12 LUQ PAIN: ICD-10-CM

## 2025-03-19 PROCEDURE — 76700 US EXAM ABDOM COMPLETE: CPT

## 2025-03-21 ENCOUNTER — RESULTS FOLLOW-UP (OUTPATIENT)
Dept: FAMILY MEDICINE CLINIC | Facility: CLINIC | Age: 70
End: 2025-03-21

## 2025-03-24 ENCOUNTER — OFFICE VISIT (OUTPATIENT)
Dept: FAMILY MEDICINE CLINIC | Facility: CLINIC | Age: 70
End: 2025-03-24
Payer: MEDICARE

## 2025-03-24 VITALS
WEIGHT: 227.2 LBS | BODY MASS INDEX: 30.11 KG/M2 | OXYGEN SATURATION: 100 % | DIASTOLIC BLOOD PRESSURE: 70 MMHG | HEIGHT: 73 IN | HEART RATE: 82 BPM | TEMPERATURE: 97.3 F | SYSTOLIC BLOOD PRESSURE: 124 MMHG

## 2025-03-24 DIAGNOSIS — I10 PRIMARY HYPERTENSION: Primary | ICD-10-CM

## 2025-03-24 DIAGNOSIS — I50.20 HFREF (HEART FAILURE WITH REDUCED EJECTION FRACTION) (HCC): ICD-10-CM

## 2025-03-24 DIAGNOSIS — Z95.1 HX OF CABG: ICD-10-CM

## 2025-03-24 DIAGNOSIS — R60.0 BILATERAL LEG EDEMA: ICD-10-CM

## 2025-03-24 DIAGNOSIS — I48.0 PAROXYSMAL ATRIAL FIBRILLATION (HCC): ICD-10-CM

## 2025-03-24 PROCEDURE — G2211 COMPLEX E/M VISIT ADD ON: HCPCS | Performed by: FAMILY MEDICINE

## 2025-03-24 PROCEDURE — 99214 OFFICE O/P EST MOD 30 MIN: CPT | Performed by: FAMILY MEDICINE

## 2025-03-24 NOTE — ASSESSMENT & PLAN NOTE
Wt Readings from Last 3 Encounters:   03/24/25 103 kg (227 lb 3.2 oz)   02/12/25 105 kg (232 lb)   01/24/25 104 kg (229 lb 12.8 oz)     Continue torsemide and monitor weight daily

## 2025-03-24 NOTE — PROGRESS NOTES
Name: John Paul Borjas      : 1955      MRN: [id number]  Encounter Provider: Hadley Vargas MD  Encounter Date: 3/24/2025   Encounter department: [facility]    Assessment & Plan  Primary hypertension  Bp at goal, 124/70  Well controlled with current medications       Paroxysmal atrial fibrillation (HCC)  Rate controlled with metoprolol  Warfarin was stopped after back surgery  Currently just on aspirin   Discussed the risk and benefit of resuming anticoagulation with warfarin with cardiology in setting of history of epidural hematoma and abdominal aortic aneurysm       Hx of CABG  Historical        HFrEF (heart failure with reduced ejection fraction) (HCC)  Wt Readings from Last 3 Encounters:   25 103 kg (227 lb 3.2 oz)   25 105 kg (232 lb)   25 104 kg (229 lb 12.8 oz)     Continue torsemide and monitor weight daily       Bilateral leg edema  Present, stable             History of Present Illness       HPI    John Paul is a 69-year-old male patient here for follow-up.  Patient was last seen for flulike symptoms on 2025.  Today patient reports improvement in symptoms, continues to have some shortness of breath that he attributes to his baseline cardiovascular disease.  Patient has known heart failure with reduced ejection fraction, ejection fraction 20% based on most recent echocardiogram on 8/15/2024.  Ultrasound of the abdomen completed in setting of lateral left upper quadrant pain does not demonstrate any significant abnormal finding, patient does have gallstones and stable infrarenal abdominal aortic aneurysm as well as no left pleural effusion.  Patient weight has improved compared to last evaluation, he still have some lower extremity edema, right greater than left.  Patient does have some mild pain in the lower extremity.  Pt does put his leg up, has not used compression stocking     Review of Systems   Constitutional:  Positive for fatigue. Negative for chills and  "fever.   HENT:  Negative for congestion and rhinorrhea.    Respiratory:  Positive for shortness of breath (soboe).    Cardiovascular:  Negative for chest pain and palpitations.   Gastrointestinal:  Positive for abdominal pain (on the lateral LUQ, \"like a twinge\"). Negative for constipation, diarrhea, nausea and vomiting.   Genitourinary:  Negative for dysuria.   Neurological:  Negative for dizziness, light-headedness and headaches.   Psychiatric/Behavioral:  Negative for sleep disturbance.        Past Medical History:   Diagnosis Date    Coronary artery disease 3/12/2024    GERD (gastroesophageal reflux disease) 3/12/2024    Gout     H/O three vessel coronary artery bypass     Hypertension 3/12/2024    Myocardial infarction (HCC)      Past Surgical History:   Procedure Laterality Date    CORONARY ANGIOPLASTY WITH STENT PLACEMENT      CORONARY ARTERY BYPASS GRAFT       Family History   Problem Relation Age of Onset    Stroke Mother     Heart disease Brother      Social History     Tobacco Use    Smoking status: Former     Types: Cigars     Passive exposure: Current    Smokeless tobacco: Former   Vaping Use    Vaping status: Never Used   Substance and Sexual Activity    Alcohol use: Not Currently    Drug use: Not Currently     Types: Marijuana    Sexual activity: Not on file     Current Outpatient Medications on File Prior to Visit   Medication Sig    aspirin 81 mg chewable tablet Chew 81 mg daily    atorvastatin (LIPITOR) 80 mg tablet Take 80 mg by mouth every evening    busPIRone (BUSPAR) 5 mg tablet Take 1 tablet (5 mg total) by mouth 2 (two) times a day    finasteride (PROSCAR) 5 mg tablet take 1 tablet by mouth once daily    isosorbide mononitrate (IMDUR) 30 mg 24 hr tablet Take 90 mg by mouth daily    Lidocaine 4 % PTCH Place 1 patch on the skin daily    losartan (COZAAR) 25 mg tablet Take 25 mg by mouth daily    methocarbamol (ROBAXIN) 500 mg tablet take 1 tablet by mouth three times a day if needed for muscle " "spasm    metoprolol succinate (TOPROL-XL) 50 mg 24 hr tablet Take 150 mg by mouth daily    nitroglycerin (NITROSTAT) 0.4 mg SL tablet Place 1 tablet under the tongue every 5 (five) minutes as needed    sertraline (ZOLOFT) 25 mg tablet Take 1 tablet (25 mg total) by mouth daily at bedtime    allopurinol (ZYLOPRIM) 100 mg tablet Take 1 tablet (100 mg total) by mouth daily (Patient not taking: Reported on 3/24/2025)    bisacodyl (DULCOLAX) 10 mg suppository insert 1 suppository rectally daily if needed for constipation (Patient not taking: Reported on 1/24/2025)    colchicine (COLCRYS) 0.6 mg tablet take 2 tablets by mouth on day 1 then take 1 tablet by mouth twice a day for 1 week (Patient not taking: Reported on 3/24/2025)    gabapentin (Neurontin) 100 mg capsule Take 1 capsule (100 mg total) by mouth daily at bedtime (Patient not taking: Reported on 3/24/2025)    oxyCODONE (ROXICODONE) 5 immediate release tablet Take 1 tablet (5 mg total) by mouth every 12 (twelve) hours as needed for moderate pain or severe pain Max Daily Amount: 10 mg (Patient not taking: Reported on 1/24/2025)    tamsulosin (FLOMAX) 0.4 mg take 1 capsule by mouth daily with dinner (Patient not taking: Reported on 1/24/2025)    torsemide (DEMADEX) 20 mg tablet Take 40 mg by mouth daily (Patient not taking: Reported on 3/24/2025)    warfarin (COUMADIN) 5 mg tablet take 1 AND 1/2 to 2 tablets by mouth daily as directed (Patient not taking: Reported on 9/18/2024)    warfarin (COUMADIN) 7.5 mg tablet Take 7.5 mg by mouth daily (Patient not taking: Reported on 9/18/2024)     No Known Allergies  Immunization History   Administered Date(s) Administered    COVID-19 MODERNA VACC 0.5 ML IM 03/25/2021, 04/22/2021, 01/05/2022     Objective   /98 (BP Location: Right arm, Patient Position: Sitting, Cuff Size: Large)   Pulse 82   Temp (!) 97.3 °F (36.3 °C) (Temporal)   Ht 6' 1\" (1.854 m)   Wt 103 kg (227 lb 3.2 oz)   SpO2 100%   BMI 29.98 kg/m² " "    Physical Exam  Vitals reviewed.   Constitutional:       General: He is not in acute distress.     Appearance: Normal appearance. He is not ill-appearing, toxic-appearing or diaphoretic.   Cardiovascular:      Rate and Rhythm: Normal rate and regular rhythm.      Pulses: Normal pulses.   Pulmonary:      Effort: Pulmonary effort is normal.   Abdominal:      General: Abdomen is flat.      Tenderness: There is abdominal tenderness (left lateral flank).   Musculoskeletal:         General: No swelling or deformity.      Right lower leg: Edema present.      Left lower leg: Edema present.      Comments: 1+ pitting edema    Skin:     General: Skin is warm and dry.      Capillary Refill: Capillary refill takes less than 2 seconds.      Coloration: Skin is not jaundiced.   Neurological:      General: No focal deficit present.      Mental Status: He is alert.   Psychiatric:         Mood and Affect: Mood normal.         Hadley Vargas M.D.  Family Medicine    Please excuse any \"sound-alike\" errors that may have ocurred during the process of dictation. Parts of this note have been dictated and there may be errors present in the transcription process. Thank you.    "

## 2025-03-24 NOTE — ASSESSMENT & PLAN NOTE
Rate controlled with metoprolol  Warfarin was stopped after back surgery  Currently just on aspirin   Discussed the risk and benefit of resuming anticoagulation with warfarin with cardiology in setting of history of epidural hematoma and abdominal aortic aneurysm

## 2025-04-03 ENCOUNTER — TELEPHONE (OUTPATIENT)
Dept: NEPHROLOGY | Facility: CLINIC | Age: 70
End: 2025-04-03

## 2025-04-11 ENCOUNTER — TELEPHONE (OUTPATIENT)
Dept: NEPHROLOGY | Facility: CLINIC | Age: 70
End: 2025-04-11

## 2025-04-11 LAB
25(OH)D3+25(OH)D2 SERPL-MCNC: 19 NG/ML (ref 30–100)
ALBUMIN/CREAT UR: 149.1
ANION GAP SERPL CALCULATED.3IONS-SCNC: 11 MMOL/L (ref 3–11)
BASOPHILS # BLD AUTO: 0.1 THOU/CMM (ref 0–0.1)
BASOPHILS NFR BLD AUTO: 1 %
BUN SERPL-MCNC: 40 MG/DL (ref 7–28)
CALCIUM SERPL-MCNC: 9.3 MG/DL (ref 8.5–10.5)
CHLORIDE SERPL-SCNC: 101 MMOL/L (ref 100–109)
CO2 SERPL-SCNC: 28 MMOL/L (ref 21–31)
CREAT SERPL-MCNC: 2.2 MG/DL (ref 0.53–1.3)
CREAT UR-MCNC: 111.3 MG/DL (ref 50–200)
CYTOLOGY CMNT CVX/VAG CYTO-IMP: ABNORMAL
DIFFERENTIAL METHOD BLD: ABNORMAL
EOSINOPHIL # BLD AUTO: 0.3 THOU/CMM (ref 0–0.5)
EOSINOPHIL NFR BLD AUTO: 4 %
ERYTHROCYTE [DISTWIDTH] IN BLOOD BY AUTOMATED COUNT: 18.3 % (ref 12–16)
GFR/BSA.PRED SERPLBLD CYS-BASED-ARV: 32 ML/MIN/{1.73_M2}
GLUCOSE SERPL-MCNC: 115 MG/DL (ref 65–99)
GLUCOSE UR QL STRIP: NEGATIVE MG/DL
HCT VFR BLD AUTO: 36 % (ref 37–48)
HGB BLD-MCNC: 12.1 G/DL (ref 12.5–17)
HGB UR QL STRIP: NEGATIVE MG/DL
HYALINE CASTS URNS QL MICRO: ABNORMAL /LPF (ref 0–2)
KETONES UR QL STRIP: NEGATIVE MG/DL
LEUKOCYTE ESTERASE UR QL STRIP: NEGATIVE /UL
LYMPHOCYTES # BLD AUTO: 1.6 THOU/CMM (ref 1–3)
LYMPHOCYTES NFR BLD AUTO: 20 %
MCH RBC QN AUTO: 29.7 PG (ref 27–36)
MCHC RBC AUTO-ENTMCNC: 33.7 G/DL (ref 32–37)
MCV RBC AUTO: 88 FL (ref 80–100)
MICROALBUMIN UR-MCNC: 16.6 MG/DL
MONOCYTES # BLD AUTO: 0.5 THOU/CMM (ref 0.3–1)
MONOCYTES NFR BLD AUTO: 7 %
MUCOUS THREADS URNS QL MICRO: ABNORMAL
NEUTROPHILS # BLD AUTO: 5.5 THOU/CMM (ref 1.8–7.8)
NEUTROPHILS NFR BLD AUTO: 68 %
NITRITE UR QL STRIP: NEGATIVE
PH UR: 5 [PH] (ref 4.5–8)
PHOSPHATE SERPL-MCNC: 4.1 MG/DL (ref 2.3–4.6)
PLATELET # BLD AUTO: 288 THOU/CMM (ref 140–350)
PMV BLD REES-ECKER: 8.6 FL (ref 7.5–11.3)
POTASSIUM SERPL-SCNC: 3.8 MMOL/L (ref 3.5–5.2)
PROT 24H UR-MRATE: ABNORMAL MG/DL
PTH-INTACT SERPL-MCNC: 181.4 PG/ML (ref 12–88)
RBC # BLD AUTO: 4.08 MILL/CMM (ref 4–5.4)
RBC #/AREA URNS HPF: ABNORMAL /HPF (ref 0–2)
SL AMB POCT URINE COMMENT: ABNORMAL
SODIUM SERPL-SCNC: 140 MMOL/L (ref 135–145)
SP GR UR: 1.01 (ref 1–1.03)
SQUAMOUS #/AREA URNS HPF: ABNORMAL /LPF (ref 0–5)
URATE SERPL-MCNC: 9.7 MG/DL (ref 3.5–7)
WBC # BLD AUTO: 8 THOU/CMM (ref 4–10.5)
WBC #/AREA URNS HPF: ABNORMAL /HPF (ref 0–5)

## 2025-04-11 NOTE — TELEPHONE ENCOUNTER
I called patient to remind patient to have fasting lab work done prior to appointment for Tuesday 04/15/2025 with Dr. Thompson. Per patient he stated he had labs done at \[facility]\"" today 04/11/2025

## 2025-04-11 NOTE — TELEPHONE ENCOUNTER
"Pt reached answering service, he states he had his blood work done at [facility] and he still has belly pain, it's much worse the first 15 mins upon waking--pt declined speaking to a triage nurse at this time.     Pt asked me to read him what Dr. Vargas told him at his last visit regarding an aneurysm. I advised pt note is available in his mychart and per his request read verbatim \"Ultrasound of the abdomen completed in setting of lateral left upper quadrant pain does not demonstrate any significant abnormal finding, patient does have gallstones and stable infrarenal abdominal aortic aneurysm as well as no left pleural effusion.\"  "

## 2025-04-14 ENCOUNTER — TELEPHONE (OUTPATIENT)
Dept: NEPHROLOGY | Facility: CLINIC | Age: 70
End: 2025-04-14

## 2025-04-14 NOTE — TELEPHONE ENCOUNTER
I called [facility] Medicare Supplement Automated System @ [phone] to check eligible for the patient and as of 4/14/2025 the patient has current active coverage as of 12/1/2022. According to [employer] [place] [employer] [place] is a Medicare Supplement Plan N that follow all Medicare Guide Lines; if Medicare pays then [employer] will pay; if Medicare denies then [employer] will deny.  Tamia Jarrell, .

## 2025-04-14 NOTE — TELEPHONE ENCOUNTER
Called, LM to talk to PCP regarding abdominal pain. Asked pt to call us back if any questions or concerns.

## 2025-04-15 ENCOUNTER — OFFICE VISIT (OUTPATIENT)
Dept: NEPHROLOGY | Facility: CLINIC | Age: 70
End: 2025-04-15
Payer: MEDICARE

## 2025-04-15 VITALS
DIASTOLIC BLOOD PRESSURE: 70 MMHG | HEIGHT: 74 IN | HEART RATE: 82 BPM | BODY MASS INDEX: 28.88 KG/M2 | SYSTOLIC BLOOD PRESSURE: 120 MMHG | WEIGHT: 225 LBS | TEMPERATURE: 97.5 F | OXYGEN SATURATION: 98 % | RESPIRATION RATE: 16 BRPM

## 2025-04-15 DIAGNOSIS — I10 PRIMARY HYPERTENSION: ICD-10-CM

## 2025-04-15 DIAGNOSIS — I50.20 HFREF (HEART FAILURE WITH REDUCED EJECTION FRACTION) (HCC): ICD-10-CM

## 2025-04-15 DIAGNOSIS — E83.9 CHRONIC KIDNEY DISEASE-MINERAL AND BONE DISORDER: ICD-10-CM

## 2025-04-15 DIAGNOSIS — N18.32 STAGE 3B CHRONIC KIDNEY DISEASE (HCC): Primary | ICD-10-CM

## 2025-04-15 DIAGNOSIS — N18.9 CHRONIC KIDNEY DISEASE-MINERAL AND BONE DISORDER: ICD-10-CM

## 2025-04-15 DIAGNOSIS — M89.9 CHRONIC KIDNEY DISEASE-MINERAL AND BONE DISORDER: ICD-10-CM

## 2025-04-15 PROCEDURE — 99214 OFFICE O/P EST MOD 30 MIN: CPT | Performed by: INTERNAL MEDICINE

## 2025-04-15 RX ORDER — ERGOCALCIFEROL 1.25 MG/1
50000 CAPSULE, LIQUID FILLED ORAL WEEKLY
Qty: 15 CAPSULE | Refills: 0 | Status: SHIPPED | OUTPATIENT
Start: 2025-04-15

## 2025-04-15 NOTE — ASSESSMENT & PLAN NOTE
Lab Results   Component Value Date    EGFR 32 (L) 04/11/2025    EGFR 39 (L) 12/05/2024    EGFR 32 (L) 11/26/2024    CREATININE 2.20 (H) 04/11/2025    CREATININE 1.85 (H) 12/05/2024    CREATININE 2.23 (H) 11/26/2024   Patient renal function overall stable with some fluctuation.  Will continue to monitor

## 2025-04-15 NOTE — PROGRESS NOTES
NEPHROLOGY OFFICE FOLLOW UP  John Paul Borjas 69 y.o.male YOB: 1955 MRN: [id number]    Encounter: [id number] DATE: 4/15/2025    REASON FOR VISIT: John Paul Borjas is a 69 y.o. male who is here on 4/15/2025 for Chronic Kidney Disease and Follow-up      ASSESSMENT and PLAN:  John Paul was seen today for chronic kidney disease and follow-up.    Diagnoses and all orders for this visit:    Stage 3b chronic kidney disease (HCC)  -     Basic metabolic panel; Future  -     CBC and differential; Future  -     Phosphorus; Future  -     Protein / creatinine ratio, urine; Future  -     PTH, intact; Future  -     UA (URINE) with reflex to Scope; Future  -     Vitamin D 25 hydroxy; Future  -     Basic metabolic panel  -     CBC and differential  -     Phosphorus  -     Protein / creatinine ratio, urine  -     PTH, intact  -     UA (URINE) with reflex to Scope  -     Vitamin D 25 hydroxy    Chronic kidney disease-mineral and bone disorder  -     Basic metabolic panel; Future  -     CBC and differential; Future  -     Phosphorus; Future  -     Protein / creatinine ratio, urine; Future  -     PTH, intact; Future  -     UA (URINE) with reflex to Scope; Future  -     Vitamin D 25 hydroxy; Future  -     Basic metabolic panel  -     CBC and differential  -     Phosphorus  -     Protein / creatinine ratio, urine  -     PTH, intact  -     UA (URINE) with reflex to Scope  -     Vitamin D 25 hydroxy  -     ergocalciferol (VITAMIN D2) 50,000 units; Take 1 capsule (50,000 Units total) by mouth once a week    HFrEF (heart failure with reduced ejection fraction) ([facility])    Primary hypertension      Assessment & Plan  Stage 3b chronic kidney disease ([facility])  Lab Results   Component Value Date    EGFR 32 (L) 04/11/2025    EGFR 39 (L) 12/05/2024    EGFR 32 (L) 11/26/2024    CREATININE 2.20 (H) 04/11/2025    CREATININE 1.85 (H) 12/05/2024    CREATININE 2.23 (H) 11/26/2024   Patient renal function overall stable with some fluctuation.  Will continue to  monitor  Chronic kidney disease-mineral and bone disorder  Lab Results   Component Value Date    EGFR 32 (L) 04/11/2025    EGFR 39 (L) 12/05/2024    EGFR 32 (L) 11/26/2024    CREATININE 2.20 (H) 04/11/2025    CREATININE 1.85 (H) 12/05/2024    CREATININE 2.23 (H) 11/26/2024   PTH and phosphorus are within expected range.  Vitamin D level is low and will prescribe vitamin D megadose  HFrEF (heart failure with reduced ejection fraction) ([facility])  Wt Readings from Last 3 Encounters:   04/15/25 102 kg (225 lb)   03/24/25 103 kg (227 lb 3.2 oz)   02/12/25 105 kg (232 lb)     Patient is complaining of tiredness.  May be related to heart failure.  Advised to follow-up with cardiologist.  Also has a leg swelling so advised to continue diuretic        Primary hypertension  Well-controlled      Everything discussed with patient at length.  As a mention above prescribe vitamin D and advised to follow-up with cardiology.  If he continues shortness of breath he may need more diuretic but advised to discuss with cardiology.  He will be seen by Lorena in 4 months and will get blood and urine test before that visit    HPI:    Patient with stage III CKD usually see Claudia Zepeda for the follow-up    Complain of feeling tiredness    No shortness of breath does have leg swelling    No chest pain no palpitation    Occasional early morning nausea but no vomiting    Denies any loss of appetite    Does have orthopnea        REVIEW OF SYSTEMS:    Review of Systems   Constitutional:  Positive for fatigue.   HENT:  Negative for congestion.    Eyes:  Negative for photophobia and pain.   Respiratory:  Negative for chest tightness and shortness of breath.    Cardiovascular:  Positive for leg swelling. Negative for chest pain and palpitations.   Gastrointestinal:  Negative for abdominal distention, abdominal pain and blood in stool.   Endocrine: Negative for polydipsia.   Genitourinary:  Negative for difficulty urinating, dysuria, flank pain,  hematuria and urgency.   Musculoskeletal:  Negative for arthralgias and back pain.   Skin:  Negative for rash.   Neurological:  Negative for dizziness, light-headedness and headaches.   Hematological:  Does not bruise/bleed easily.   Psychiatric/Behavioral:  Negative for behavioral problems. The patient is not nervous/anxious.          PAST MEDICAL HISTORY:  Past Medical History:   Diagnosis Date    Coronary artery disease 3/12/2024    GERD (gastroesophageal reflux disease) 3/12/2024    Gout     H/O three vessel coronary artery bypass     Hypertension 3/12/2024    Myocardial infarction (HCC)        PAST SURGICAL HISTORY:  Past Surgical History:   Procedure Laterality Date    CORONARY ANGIOPLASTY WITH STENT PLACEMENT      CORONARY ARTERY BYPASS GRAFT         SOCIAL HISTORY:  Social History     Substance and Sexual Activity   Alcohol Use Not Currently     Social History     Substance and Sexual Activity   Drug Use Not Currently    Types: Marijuana     Social History     Tobacco Use   Smoking Status Former    Types: Cigars    Passive exposure: Current   Smokeless Tobacco Former       FAMILY HISTORY:  Family History   Problem Relation Age of Onset    Stroke Mother     Heart disease Brother        ALLERGY:  No Known Allergies    MEDICATIONS:    Current Outpatient Medications:     aspirin 81 mg chewable tablet, Chew 81 mg daily, Disp: , Rfl:     atorvastatin (LIPITOR) 80 mg tablet, Take 80 mg by mouth every evening, Disp: , Rfl:     busPIRone (BUSPAR) 5 mg tablet, Take 1 tablet (5 mg total) by mouth 2 (two) times a day, Disp: 180 tablet, Rfl: 1    ergocalciferol (VITAMIN D2) 50,000 units, Take 1 capsule (50,000 Units total) by mouth once a week, Disp: 15 capsule, Rfl: 0    finasteride (PROSCAR) 5 mg tablet, take 1 tablet by mouth once daily, Disp: 90 tablet, Rfl: 1    isosorbide mononitrate (IMDUR) 30 mg 24 hr tablet, Take 90 mg by mouth daily, Disp: , Rfl:     Lidocaine 4 % PTCH, Place 1 patch on the skin daily, Disp: ,  Rfl:     losartan (COZAAR) 25 mg tablet, Take 25 mg by mouth daily, Disp: , Rfl:     methocarbamol (ROBAXIN) 500 mg tablet, take 1 tablet by mouth three times a day if needed for muscle spasm, Disp: 60 tablet, Rfl: 0    metoprolol succinate (TOPROL-XL) 50 mg 24 hr tablet, Take 100 mg by mouth daily, Disp: , Rfl:     nitroglycerin (NITROSTAT) 0.4 mg SL tablet, Place 1 tablet under the tongue every 5 (five) minutes as needed, Disp: , Rfl:     sertraline (ZOLOFT) 25 mg tablet, Take 1 tablet (25 mg total) by mouth daily at bedtime, Disp: 90 tablet, Rfl: 1    torsemide (DEMADEX) 20 mg tablet, Take 40 mg by mouth daily, Disp: , Rfl:     allopurinol (ZYLOPRIM) 100 mg tablet, Take 1 tablet (100 mg total) by mouth daily (Patient not taking: Reported on 3/24/2025), Disp: 90 tablet, Rfl: 1    bisacodyl (DULCOLAX) 10 mg suppository, insert 1 suppository rectally daily if needed for constipation (Patient not taking: Reported on 1/24/2025), Disp: , Rfl:     colchicine (COLCRYS) 0.6 mg tablet, take 2 tablets by mouth on day 1 then take 1 tablet by mouth twice a day for 1 week (Patient not taking: Reported on 1/24/2025), Disp: 180 tablet, Rfl: 2    gabapentin (Neurontin) 100 mg capsule, Take 1 capsule (100 mg total) by mouth daily at bedtime (Patient not taking: Reported on 12/4/2024), Disp: 30 capsule, Rfl: 0    oxyCODONE (ROXICODONE) 5 immediate release tablet, Take 1 tablet (5 mg total) by mouth every 12 (twelve) hours as needed for moderate pain or severe pain Max Daily Amount: 10 mg (Patient not taking: Reported on 1/24/2025), Disp: 60 tablet, Rfl: 0    tamsulosin (FLOMAX) 0.4 mg, take 1 capsule by mouth daily with dinner (Patient not taking: Reported on 1/24/2025), Disp: 30 capsule, Rfl: 5    warfarin (COUMADIN) 5 mg tablet, take 1 AND 1/2 to 2 tablets by mouth daily as directed (Patient not taking: Reported on 9/18/2024), Disp: , Rfl:     warfarin (COUMADIN) 7.5 mg tablet, Take 7.5 mg by mouth daily (Patient not taking:  "Reported on 9/18/2024), Disp: , Rfl:     PHYSICAL EXAM:  Vitals:    04/15/25 1621   BP: 120/70   BP Location: Right arm   Patient Position: Sitting   Cuff Size: Standard   Pulse: 82   Resp: 16   Temp: 97.5 °F (36.4 °C)   TempSrc: Temporal   SpO2: 98%   Weight: 102 kg (225 lb)   Height: 6' 2\" (1.88 m)     Body mass index is 28.89 kg/m².    Physical Exam  Constitutional:       General: He is not in acute distress.     Appearance: He is well-developed.   HENT:      Head: Normocephalic.      Mouth/Throat:      Mouth: Mucous membranes are moist.   Eyes:      General: No scleral icterus.     Conjunctiva/sclera: Conjunctivae normal.   Neck:      Vascular: No JVD.   Cardiovascular:      Rate and Rhythm: Normal rate.      Heart sounds: Normal heart sounds.   Pulmonary:      Effort: Pulmonary effort is normal.      Breath sounds: No wheezing.   Abdominal:      Palpations: Abdomen is soft.      Tenderness: There is no abdominal tenderness.   Musculoskeletal:         General: Swelling present. Normal range of motion.      Cervical back: Neck supple.   Skin:     General: Skin is warm.      Findings: No rash.   Neurological:      Mental Status: He is alert and oriented to person, place, and time.   Psychiatric:         Behavior: Behavior normal.         LAB RESULTS:  Results for orders placed or performed in visit on 04/11/25   PTH, intact   Result Value Ref Range    PTH, Intact 181.4 (H) 12.0 - 88.0 pg/mL         Portions of the record may have been created with voice recognition software. Occasional wrong word or \"sound a like\" substitutions may have occurred due to the inherent limitations of voice recognition software. Read the chart carefully and recognize, using context, where substitutions have occurred. If you have any questions, please contact the dictating provider.   "

## 2025-04-15 NOTE — ASSESSMENT & PLAN NOTE
Wt Readings from Last 3 Encounters:   04/15/25 102 kg (225 lb)   03/24/25 103 kg (227 lb 3.2 oz)   02/12/25 105 kg (232 lb)     Patient is complaining of tiredness.  May be related to heart failure.  Advised to follow-up with cardiologist.  Also has a leg swelling so advised to continue diuretic

## 2025-05-09 ENCOUNTER — OFFICE VISIT (OUTPATIENT)
Dept: FAMILY MEDICINE CLINIC | Facility: CLINIC | Age: 70
End: 2025-05-09
Payer: MEDICARE

## 2025-05-09 VITALS
HEART RATE: 90 BPM | OXYGEN SATURATION: 100 % | BODY MASS INDEX: 28.85 KG/M2 | DIASTOLIC BLOOD PRESSURE: 94 MMHG | TEMPERATURE: 98 F | HEIGHT: 74 IN | SYSTOLIC BLOOD PRESSURE: 126 MMHG | WEIGHT: 224.8 LBS

## 2025-05-09 DIAGNOSIS — M10.379 ACUTE GOUT DUE TO RENAL IMPAIRMENT INVOLVING FOOT, UNSPECIFIED LATERALITY: ICD-10-CM

## 2025-05-09 DIAGNOSIS — K21.9 GASTROESOPHAGEAL REFLUX DISEASE WITHOUT ESOPHAGITIS: ICD-10-CM

## 2025-05-09 DIAGNOSIS — R60.0 BILATERAL LEG EDEMA: ICD-10-CM

## 2025-05-09 DIAGNOSIS — E79.0 ELEVATED URIC ACID IN BLOOD: ICD-10-CM

## 2025-05-09 DIAGNOSIS — R73.03 PREDIABETES: ICD-10-CM

## 2025-05-09 DIAGNOSIS — Z00.00 MEDICARE ANNUAL WELLNESS VISIT, SUBSEQUENT: Primary | ICD-10-CM

## 2025-05-09 DIAGNOSIS — E78.2 MIXED HYPERLIPIDEMIA: ICD-10-CM

## 2025-05-09 DIAGNOSIS — M54.32 SCIATICA OF LEFT SIDE: ICD-10-CM

## 2025-05-09 PROCEDURE — G0439 PPPS, SUBSEQ VISIT: HCPCS | Performed by: FAMILY MEDICINE

## 2025-05-09 PROCEDURE — G2211 COMPLEX E/M VISIT ADD ON: HCPCS | Performed by: FAMILY MEDICINE

## 2025-05-09 PROCEDURE — 99214 OFFICE O/P EST MOD 30 MIN: CPT | Performed by: FAMILY MEDICINE

## 2025-05-09 RX ORDER — PREDNISONE 20 MG/1
40 TABLET ORAL DAILY
Qty: 10 TABLET | Refills: 0 | Status: SHIPPED | OUTPATIENT
Start: 2025-05-09 | End: 2025-05-14

## 2025-05-09 RX ORDER — LIDOCAINE 4 G/G
1 PATCH TOPICAL DAILY
Qty: 30 PATCH | Refills: 0 | Status: SHIPPED | OUTPATIENT
Start: 2025-05-09

## 2025-05-09 NOTE — ASSESSMENT & PLAN NOTE
Consider using compression stocking to below the knees to help with fluid retention  Continue fluid restriction, cardiology recommended between 32 to 64 ounces, specifically water, patient did not taking fluid more than 48 ounces for now

## 2025-05-09 NOTE — PATIENT INSTRUCTIONS
Start taking allopurinol 100 mg once a day  Recheck uric acid in approximately 4 weeks to get the results hemoglobin A1c and lipid panel  Try apple cider vinegar for acid reflux type symptoms  Obtain compression stocking just below the knee to help with swelling in her legs  Limit fluid intake to no more than 48 ounces

## 2025-05-09 NOTE — PROGRESS NOTES
Name: John Paul Borjas      : 1955      MRN: [id number]  Encounter Provider: Hadley Vargas MD  Encounter Date: 2025   Encounter department: [facility]  :  Assessment & Plan  Medicare annual wellness visit, subsequent  Annual Medicare wellness complete at this time  Orders:    Lipid panel; Future    Hemoglobin A1C; Future    Bilateral leg edema  Consider using compression stocking to below the knees to help with fluid retention  Continue fluid restriction, cardiology recommended between 32 to 64 ounces, specifically water, patient did not taking fluid more than 48 ounces for now       Elevated uric acid in blood         Gastroesophageal reflux disease without esophagitis  Try apple cider vinegar diluted solution to see if this will help with morning dry heaves       Acute gout due to renal impairment involving foot, unspecified laterality    Orders:    predniSONE 20 mg tablet; Take 2 tablets (40 mg total) by mouth daily for 5 days    Uric acid; Future    Mixed hyperlipidemia    Orders:    Lipid panel; Future    Prediabetes    Orders:    Hemoglobin A1C; Future    Sciatica of left side    Orders:    Lidocaine 4 % PTCH; Place 1 patch on the skin daily       Preventive health issues were discussed with patient, and age appropriate screening tests were ordered as noted in patient's After Visit Summary. Personalized health advice and appropriate referrals for health education or preventive services given if needed, as noted in patient's After Visit Summary.    History of Present Illness       HPI     John Paul is a 69-year-old male patient here for annual Medicare wellness visit.  Patient was seen by cardiologist recently, currently on torsemide for bilateral lower extremity edema.  Patient reports this is improving however he continues to have mild symptoms.  Patient is concerned about possible recurrence of gout symptoms as he has significant pain involving bilateral feet.  Uric acid checked most  recently is 9.7 in 4/11/25.   Patient's weight has been stable, we 225 pounds on 4/15/2025 and 224 pounds today.  Patient has not been taking his allopurinol as he believes this was only for acute exacerbations.    He also noticed some dry heaving especially in the morning.  Patient does have a history of acid reflux symptoms in the past.    Pt has been using his zoloft and buspar     Patient Care Team:  Hadley Vargas MD as PCP - General (Family Medicine)  CHIQUITA Kaur as Nurse Practitioner (Nephrology)  Juan Thompson MD (Nephrology)    Review of Systems   Constitutional:  Negative for chills and fever.   HENT:  Negative for congestion, rhinorrhea and sore throat.    Respiratory:  Positive for shortness of breath. Negative for chest tightness.    Cardiovascular:  Positive for leg swelling.   Gastrointestinal:  Positive for nausea (dry heaving in the morning). Negative for abdominal pain, constipation, diarrhea and vomiting.   Neurological:  Negative for dizziness, light-headedness and headaches.   Psychiatric/Behavioral:  Negative for sleep disturbance.      Medical History Reviewed by provider this encounter:  [facility]       Annual Wellness Visit Questionnaire   John Paul is here for his Subsequent Wellness visit.     Health Risk Assessment:   Patient rates overall health as poor. Patient feels that their physical health rating is slightly worse. Patient is dissatisfied with their life. Eyesight was rated as slightly worse. Hearing was rated as slightly worse. Patient feels that their emotional and mental health rating is much worse. Patients states they are often angry. Patient states they are always unusually tired/fatigued. Pain experienced in the last 7 days has been a lot. Patient's pain rating has been 5/10. Patient states that he has experienced no weight loss or gain in last 6 months. Secondary to cardiovascular disease, currently following with nephrology and cardiology    Fall Risk Screening:    In the past year, patient has experienced: history of falling in past year    Number of falls: 1  Injured during fall?: No    Feels unsteady when standing or walking?: Yes    Worried about falling?: Yes      Home Safety:  Patient does not have trouble with stairs inside or outside of their home. Patient has working smoke alarms and has no working carbon monoxide detector. Home safety hazards include: none.     Nutrition:   Current diet is Regular.     Medications:   Patient is not currently taking any over-the-counter supplements. Patient is able to manage medications.     Activities of Daily Living (ADLs)/Instrumental Activities of Daily Living (IADLs):   Walk and transfer into and out of bed and chair?: Yes  Dress and groom yourself?: Yes    Bathe or shower yourself?: Yes    Feed yourself? Yes  Do your laundry/housekeeping?: Yes  Manage your money, pay your bills and track your expenses?: Yes  Make your own meals?: Yes    Do your own shopping?: Yes    Previous Hospitalizations:   Any hospitalizations or ED visits within the last 12 months?: Yes    How many hospitalizations have you had in the last year?: 3-4    Advance Care Planning:   Living will: No    Durable POA for healthcare: No    Advanced directive: No    Advanced directive counseling given: Yes      Comments: 5 wishes given in the past    Cognitive Screening:   Provider or family/friend/caregiver concerned regarding cognition?: No    Preventive Screenings      Cardiovascular Screening:    General: Risks and Benefits Discussed    Due for: Lipid Panel      Diabetes Screening:     General: Screening Current      Colorectal Cancer Screening:     General: Screening Not Indicated      Prostate Cancer Screening:    General: Screening Not Indicated      Osteoporosis Screening:    General: Screening Not Indicated      Abdominal Aortic Aneurysm (AAA) Screening:    Risk factors include: age between 65-74 yo and tobacco use        General: Screening Not Indicated  and History AAA      Lung Cancer Screening:     General: Screening Not Indicated      Hepatitis C Screening:    General: Screening Not Indicated    Immunizations:  - Immunizations due: Prevnar 20 and Zoster (Shingrix)    Screening, Brief Intervention, and Referral to Treatment (SBIRT)     Screening      AUDIT-C Screenin) How often did you have a drink containing alcohol in the past year? never  2) How many drinks did you have on a typical day when you were drinking in the past year? 0  3) How often did you have 6 or more drinks on one occasion in the past year? never    AUDIT-C Score: 0  Interpretation: Score 0-3 (male): Negative screen for alcohol misuse    Single Item Drug Screening:  How often have you used an illegal drug (including marijuana) or a prescription medication for non-medical reasons in the past year? never    Single Item Drug Screen Score: 0  Interpretation: Negative screen for possible drug use disorder    Other Counseling Topics:   Calcium and vitamin D intake and regular weightbearing exercise.     Social Drivers of Health     Financial Resource Strain: Low Risk  (2024)    Received from [facility]    Overall Financial Resource Strain (CARDIA)     Difficulty of Paying Living Expenses: Not hard at all   Food Insecurity: No Food Insecurity (2025)    Hunger Vital Sign     Worried About Running Out of Food in the Last Year: Never true     Ran Out of Food in the Last Year: Never true   Transportation Needs: No Transportation Needs (2025)    PRAPARE - Transportation     Lack of Transportation (Medical): No     Lack of Transportation (Non-Medical): No   Housing Stability: Low Risk  (2025)    Housing Stability Vital Sign     Unable to Pay for Housing in the Last Year: No     Number of Times Moved in the Last Year: 0     Homeless in the Last Year: No   Utilities: Not At Risk (2025)    [facility] Utilities     Threatened with loss of utilities: No     No results  "found.    Objective   /94 (BP Location: Right arm, Patient Position: Sitting, Cuff Size: Large)   Pulse 90   Temp 98 °F (36.7 °C) (Temporal)   Ht 6' 2\" (1.88 m)   Wt 102 kg (224 lb 12.8 oz)   SpO2 100%   BMI 28.86 kg/m²     Physical Exam  Vitals reviewed.   Constitutional:       General: He is not in acute distress.     Appearance: Normal appearance. He is not ill-appearing, toxic-appearing or diaphoretic.   Cardiovascular:      Rate and Rhythm: Normal rate.      Pulses: Normal pulses.   Pulmonary:      Comments: Increased work of breathing  Abdominal:      General: Abdomen is flat.   Musculoskeletal:      Right lower leg: Edema present.      Left lower leg: Edema present.      Comments: Significant pitting edema noted on bilateral feet, up to lower one third of the shin     Skin:     General: Skin is warm and dry.      Capillary Refill: Capillary refill takes less than 2 seconds.      Coloration: Skin is not jaundiced.      Findings: No bruising.   Neurological:      General: No focal deficit present.      Mental Status: He is alert.   Psychiatric:         Mood and Affect: Mood normal.           Hadley Vargas M.D.  Family Medicine    Please excuse any \"sound-alike\" errors that may have ocurred during the process of dictation. Parts of this note have been dictated and there may be errors present in the transcription process. Thank you.    "

## 2025-05-20 LAB
CHOLEST SERPL-MCNC: 176 MG/DL
CHOLEST/HDLC SERPL: 3.7 {RATIO}
EST. AVERAGE GLUCOSE BLD GHB EST-MCNC: 134 MG/DL
HBA1C MFR BLD: 6.3 %
HDLC SERPL-MCNC: 48 MG/DL (ref 23–92)
LDLC SERPL CALC-MCNC: 79 MG/DL
NONHDLC SERPL-MCNC: 128 MG/DL
TRIGL SERPL-MCNC: 246 MG/DL
URATE SERPL-MCNC: 9.1 MG/DL (ref 3.5–7)

## 2025-05-21 ENCOUNTER — NURSE TRIAGE (OUTPATIENT)
Dept: OTHER | Facility: OTHER | Age: 70
End: 2025-05-21

## 2025-05-21 ENCOUNTER — TELEPHONE (OUTPATIENT)
Age: 70
End: 2025-05-21

## 2025-05-21 NOTE — TELEPHONE ENCOUNTER
Patient called in stating that the buspar and zoloft have been causing confusion and fatigue. Patient is no longer interested in taking these medications and would like pcp to advise.

## 2025-05-21 NOTE — TELEPHONE ENCOUNTER
"FOLLOW UP:As per on call Dr Vargas : The  zoloft will need to be tapered off . It is ok to stop the buspar .For the zoloft take half a tablet a day for the next 2 weeks, and half a tablets every other day for 2 weeks afterword before stopping completely.If the symptoms are new  I'd like to see him to discuss the symptoms .   Pt verbalized understanding of Dr Vargas care advise regarding the medications Zoloft and Buspar.  Appointment scheduled for 5/27/ @1:30 pm. Pt would like a sooner afternoon appointment with Dr Vargas if available . Callback number is #[phone]    REASON FOR CONVERSATION: Medication Management    SYMPTOMS: Confusion and fatigue    OTHER: spelling difficulty    DISPOSITION: Call PCP Now  Esc to on call Dr Vargas : Pt does not want to keep taking the medication Buspar and zoloft if he will be having side side effects and is looking for management of the medication. He is experiencing confusion ,fatigue and difficulty spelling. Please advise   Reason for Disposition   [1] Caller has URGENT medicine question about med that PCP or specialist prescribed AND [2] triager unable to answer question    Answer Assessment - Initial Assessment Questions  1. NAME of MEDICINE: \"What medicine(s) are you calling about?\"      Buspar and Zoloft  2. QUESTION: \"What is your question?\" (e.g., double dose of medicine, side effect)      Pt does not want to keep taking this medication if he will be having side side effects and is looking for management of the medication.   3. PRESCRIBER: \"Who prescribed the medicine?\" Reason: if prescribed by specialist, call should be referred to that group.      Sam  4. SYMPTOMS: \"Do you have any symptoms?\" If Yes, ask: \"What symptoms are you having?\"  \"How bad are the symptoms (e.g., mild, moderate, severe)      Confusion ,fatigue and difficulty spelling.    Protocols used: Medication Question Call-Adult-    "

## 2025-05-23 ENCOUNTER — RESULTS FOLLOW-UP (OUTPATIENT)
Dept: FAMILY MEDICINE CLINIC | Facility: CLINIC | Age: 70
End: 2025-05-23

## 2025-07-03 DIAGNOSIS — M10.9 ACUTE GOUT OF LEFT HAND, UNSPECIFIED CAUSE: ICD-10-CM

## 2025-07-03 RX ORDER — ALLOPURINOL 100 MG/1
100 TABLET ORAL DAILY
Qty: 90 TABLET | Refills: 0 | Status: SHIPPED | OUTPATIENT
Start: 2025-07-03

## 2025-07-03 NOTE — TELEPHONE ENCOUNTER
Reason for call:   [x] Refill   [] Prior Auth  [] Other:     Office:   [x] PCP/Provider -   [] Specialty/Provider -     Medication: Allopurinol     Dose/Frequency: 100 mg    Quantity: 90 tablets    Pharmacy: [facility]/pharmacy #[id number] - [place], [place] - [address] [phone]    Local Pharmacy   Does the patient have enough for 3 days?   [] Yes   [x] No - Send as HP to POD    Mail Away Pharmacy   Does the patient have enough for 10 days?   [] Yes   [] No - Send as HP to POD

## 2025-07-17 ENCOUNTER — OFFICE VISIT (OUTPATIENT)
Dept: FAMILY MEDICINE CLINIC | Facility: CLINIC | Age: 70
End: 2025-07-17
Payer: MEDICARE

## 2025-07-17 VITALS
HEART RATE: 83 BPM | BODY MASS INDEX: 29.26 KG/M2 | HEIGHT: 74 IN | WEIGHT: 228 LBS | DIASTOLIC BLOOD PRESSURE: 76 MMHG | RESPIRATION RATE: 16 BRPM | TEMPERATURE: 97.7 F | SYSTOLIC BLOOD PRESSURE: 112 MMHG | OXYGEN SATURATION: 99 %

## 2025-07-17 DIAGNOSIS — H61.23 BILATERAL IMPACTED CERUMEN: Primary | ICD-10-CM

## 2025-07-17 PROCEDURE — 69210 REMOVE IMPACTED EAR WAX UNI: CPT | Performed by: FAMILY MEDICINE

## 2025-07-17 NOTE — PROGRESS NOTES
"Ear cerumen removal    Date/Time: 7/17/2025 4:15 PM    Performed by: Hadley Vargas MD  Authorized by: Hadley Vargas MD    Universal Protocol:  procedure performed by consultantConsent: Verbal consent obtained  Risks and benefits: risks, benefits and alternatives were discussed  Consent given by: patient  Time out: Immediately prior to procedure a \"time out\" was called to verify the correct patient, procedure, equipment, support staff and site/side marked as required.  Timeout called at: 7/17/2025 4:21 PM.  Patient understanding: patient states understanding of the procedure being performed  Patient consent: the patient's understanding of the procedure matches consent given  Patient identity confirmed: verbally with patient    Patient location:  Clinic  Procedure details:     Local anesthetic:  None    Location:  Both ears    Procedure type: irrigation with instrumentation      Instrumentation: curette      Approach:  External  Post-procedure details:     Complication:  None    Hearing quality:  Improved    Patient tolerance of procedure:  Tolerated well, no immediate complications    Hadley Vargas M.D.  Family Medicine    Please excuse any \"sound-alike\" errors that may have ocurred during the process of dictation. Parts of this note have been dictated and there may be errors present in the transcription process. Thank you.    "

## 2025-08-08 ENCOUNTER — TELEPHONE (OUTPATIENT)
Dept: NEPHROLOGY | Facility: CLINIC | Age: 70
End: 2025-08-08

## 2025-08-15 ENCOUNTER — TELEPHONE (OUTPATIENT)
Dept: NEPHROLOGY | Facility: CLINIC | Age: 70
End: 2025-08-15

## 2025-08-19 ENCOUNTER — TELEPHONE (OUTPATIENT)
Age: 70
End: 2025-08-19

## 2025-08-19 DIAGNOSIS — F41.9 ANXIETY: ICD-10-CM

## 2025-08-19 DIAGNOSIS — N40.0 BENIGN PROSTATIC HYPERPLASIA, UNSPECIFIED WHETHER LOWER URINARY TRACT SYMPTOMS PRESENT: ICD-10-CM

## 2025-08-19 RX ORDER — FINASTERIDE 5 MG/1
5 TABLET, FILM COATED ORAL DAILY
Qty: 90 TABLET | Refills: 1 | Status: SHIPPED | OUTPATIENT
Start: 2025-08-19

## 2025-08-19 RX ORDER — SERTRALINE HYDROCHLORIDE 25 MG/1
25 TABLET, FILM COATED ORAL
Qty: 90 TABLET | Refills: 1 | Status: SHIPPED | OUTPATIENT
Start: 2025-08-19 | End: 2026-02-15